# Patient Record
Sex: MALE | Race: WHITE
[De-identification: names, ages, dates, MRNs, and addresses within clinical notes are randomized per-mention and may not be internally consistent; named-entity substitution may affect disease eponyms.]

---

## 2017-02-27 NOTE — PCM.OPNOTE
- General Post-Op/Procedure Note


Date of Surgery/Procedure: 02/27/17


Operative Procedure(s): wide local excision with 1 cm margins


Pre Op Diagnosis: melenoma in situ left neck


Post-Op Diagnosis: Same


Anesthesia Technique: MAC


Primary Surgeon: Hans Lange


EBL in mLs: 0


Complications: None


Condition: Good

## 2017-02-27 NOTE — PCM.PREANE
Preanesthetic Assessment





- ANESTHESIA/TRANSFUSION/FAMILY HX


Anesthesia/Transfusion History: No Prior Transfusion(s), Prior Anesthesia (EGD'

s and Colonoscopies, pt. denies having a general anesthetic.)


Type of Anesthesia Reaction: Denies: Allergy, Anesthesia Awareness, Excessive 

Somnolence, Excessive Nausea/Vomiting, Excessive Itching, Excessive Shivering, 

Malignant Hyperthermia, Malignant Hyperthermia, Family History, 

Pseudocholinesterase Deficiency, Pseudocholinesterase Deficiency, Family 

History of, Urinary Retention, Unknown, Other (see below)


Family History of Anesthesia Reaction: No


Intubation History: Unknown





- REVIEW OF SYSTEMS


Constitutional: Reports: no symptoms


CNS: Reports: no symptoms (Bone pain from metastatic prostate cancer noted.), 

stroke/TIA (in 2014.), weakness


Respiratory: Reports: no symptoms (quit smoking in 1966)


Cardiovascular: Reports: blood pressure problem


GI: Reports: no symptoms (GERD with hiatal hernia noted.), difficulty swallowing


Other: Reports: easy bleeding (Patient on Xarelto for history of pulmonary 

emboli/DVT.), easy bruising





- PHYSICAL ASSESSMENT


HR: 64


O2 Sat by Pulse Oximetry: 98


RR: 16


BP: 174/82


Temp: 36.4 C


Vital Signs: 





 Last Vital Signs











Temp  36.4 C   02/27/17 08:50


 


Pulse  64   02/27/17 08:50


 


Resp  16   02/27/17 08:50


 


BP  174/82 H  02/27/17 08:50


 


Pulse Ox  98   02/27/17 08:50











Height: 1.8 m


Weight: 85.275 kg


NPO Status Date: 02/26/17


NPO Status Time: 22:00


ASA Class: 3


Mental Status: alert & oriented x3


Airway Class: Mallampati = 2


Dentition: Reports: normal dentition, caries


Thyro-Mental Finger Breadths: 3


Mouth Opening Finger Breadths: 3


ROM/Head Extension: full


Respiratory Status: lungs clear to auscultation bilaterally


Cardiovascular Status: regular rate & rhythm, normal S1, S2, no murmur, blood 

pressure WNL





- LAB


Values: 





Reviewed and noted.





- IMAGING/EKG


Impressions: 





EKG: SR rate=70, Probable left atrial enlargement





- ALLERGIES


Allergies/Adverse Reactions: 


 Allergies











Allergy/AdvReac Type Severity Reaction Status Date / Time


 


metoprolol Allergy  Anxiety Verified 02/24/17 10:04


 


rosuvastatin [From Crestor] Allergy  Anxiety Verified 02/24/17 10:04


 


Statins-Hmg-Coa Reductase Allergy  Leg Cramps Verified 02/24/17 10:04





Inhibitor     














- ANESTHESIA PLAN


Preop Beta Blocker: No


Anesthesia Type Planned: MAC





- ACKNOWLEDGEMENTS


Pt an appropriate candidate for the planned anesthesia: Yes


Alternatives and risks of anesthesia discussed w pt/guardian: Yes


Pt/Guardian understands and agree with anesthesia plan: Yes





PreAnesthesia Questionnaire





- Past Health History


Medical/Surgical History: Denies Medical/Surgical History


HEENT History: Reports: None


Cardiovascular History: Reports: High cholesterol, Hypertension


Respiratory History: Reports: PE


Gastrointestinal History: Reports: Hiatal hernia


Other Gastrointestinal History: intestinal polyp, intestinal  metaplasia of 

gastric mucosa, schatzkis ring


Genitourinary History: Reports: Prostate disorder


OB/GYN History: Reports: None


Musculoskeletal History: Reports: Osteoarthritis, Other (see below)


Other Musculoskeletal History: L shoulder osteoarthritis, restless leg syndrome


Neurological History: Reports: TIA


Psychiatric History: Reports: None


Endocrine/Metabolic History: Reports: None


Hematologic History: Reports: None


Immunologic History: Reports: None


Oncologic (Cancer) History: Reports: Prostate


Dermatologic History: Reports: Melanoma





- Past Surgical History


GI Surgical History: Reports: Colonoscopy, EGD





- SUBSTANCE USE


Smoking Status *Q: Former Smoker


Tobacco Use Within Last Twelve Months: No


Second Hand Smoke Exposure: Yes


Recreational Drug Use History: No





- HOME MEDS


Home Medications: 


 Home Meds





Pramipexole Di-HCl [Mirapex] 0.75 mg PO QPM 03/04/16 [History]


Ranitidine [Zantac] 150 mg PO DAILY 03/04/16 [History]


Rivaroxaban [Xarelto] 15 mg PO BID 21 Days 03/06/16 [Rx]


traMADol HCl [Tramadol HCl] 50 mg PO Q6H PRN #15 tablet 03/06/16 [Rx]


Losartan [Cozaar] 1 tab PO DAILY 02/17/17 [History]


Sennosides/Docusate Sodium [Senna-Docusate Sodium] 1 tab PO DAILY 02/17/17 [

History]











- CURRENT (IN HOUSE) MEDS


Current Meds: 





 Current Medications





Lactated Ringer's (Ringers, Lactated)  1,000 mls @ 125 mls/hr IV ASDIRECTED JONY


   Stop: 02/27/17 23:00


   Last Admin: 02/27/17 09:00 Dose:  125 mls/hr


Lidocaine/Sodium Bicarbonate (Buffered Lidocaine 1% In Ns 8.4%)  0.25 ml IV 

ONETIME PRN


   PRN Reason: Prior to IV Start


   Stop: 02/27/17 18:00


   Last Admin: 02/27/17 08:59 Dose:  0.25 ml


Sodium Chloride (Saline Flush)  10 ml FLUSH ASDIRECTED PRN


   PRN Reason: Keep Vein Open


   Stop: 02/27/17 18:00





Discontinued Medications





Lidocaine/Epinephrine (Xylocaine 1% With Epinephrine 1:100,000) Confirm 

Administered Dose 20 ml .ROUTE .[facility]


   Stop: 02/27/17 09:00

## 2017-02-27 NOTE — [id number]
Post Anesthesia Note





- EVALUATION WITHIN 48HRS OF ANESTHETIC


Vital Signs in Normal Range: Yes


Patient Participated in Evaluation: Yes


Respiratory Function Stable: Yes


Airway Patent: Yes


Cardiovascular Function Stable: Yes


Hydration Status Stable: Yes


Pain Control Satisfactory: Yes


Nausea and Vomiting Control Satisfactory: Yes


Mental Status Recovered: Yes

## 2017-02-28 NOTE — OR
DATE OF OPERATION:  02/27/2017

 

SURGEON:  Hans Lange MD

 

ADDENDUM:

 

 

ESTIMATED BLOOD LOSS:

Zero mL.

 

DD:  02/27/2017 10:20:04

DT:  02/27/2017 21:23:06  MMODAL

Job #:  [id number]

## 2017-02-28 NOTE — OR
DATE OF OPERATION:  02/27/2017

 

SURGEON:  Hans Lange MD

 

PREOPERATIVE DIAGNOSIS:

Melanoma in situ, left neck.

 

POSTOPERATIVE DIAGNOSIS:

Melanoma in situ, left neck.

 

POPERAITON PERFORMED:

Excision with 1 cm margin done under IV sedation local anesthetic.

 

DESCRIPTION OF PROCEDURE:

The patient was taken to the operating room, placed in a supine position,

connected to monitoring equipment, given IV sedation.  The patient placed with a

roll under the left shoulder to expose the posterior portion of the left neck

area in question have not been marked was identified and prepped with Betadine,

draped off in a sterile fashion.  Using a marking pen and a ruler a 1 cm margin

around the lesion was then traced and the area was anesthetized with 1%

Xylocaine and then excised.  Excision was carried through the skin into the

subcuticular tissue.  The subcuticular tissue was then closed with interrupted

of 3-0 Vicryl suture and the skin with interrupted 4-0 Prolene suture.  Sterile

dressing placed.  The patient tolerated the procedure and sent to recovery room

in a stable condition.

 

 

 

ANESTHESIA:

 

 

ESTIMATED BLOOD LOSS:

 

 

DD:  02/27/2017 10:15:28

DT:  02/27/2017 21:20:39  MMODAL

Job #:  [id number]

## 2017-07-19 NOTE — PCM.HP
H&P History of Present Illness





- General


Date of Service: 07/19/17


Admit Problem/Dx: 


Small Bowel Obstruction





Source of Information: Patient, Old Records, Provider, RN Notes Reviewed


History Limitations: Reports: No Limitations





- History of Present Illness


Initial Comments - Free Text/Narative: 


This is a 76-year-old pleasant elderly white male with past medical history of 

hypertension, hyperlipidemia, history of hiatal hernia, intestinal polyp, 

intestinal metaplasia of gastric mucosa, Schatzki's ring, melanoma, 

osteoarthritis, restless leg syndrome, TIA, and history of PE/DVT who presents 

to the emergency department with complaints of abdominal pain that is constant 

in nature with no known precipitating factor. His pain is sharp and is 

localized in the epigastric area with radiation to suprapubic region. He rates 

his pain as severe 10 out of 10. He denies anything that would make his symptom 

better. His pain started this morning while on his way to work. By noon, his 

symptom has progressively gotten worse and became dizzy and experienced near 

syncope. 





His chief of complaint is associated with nausea but without vomiting. He 

denies any fever or chills. No shortness of breath, chest pain or dysuria. He 

denies any history of abdominal surgery. However he carries a history of colon 

and prostate cancer with metastatic bone disease.





His initial workup in the emergency department reveals a CBC remarkable for WBC 

of 9.49 and neutrophils of 70.1%. PT of 11.8, INR 1.08, and aPTT of 30. His 

chemistry is remarkable for K of 2.4, anion gap of 19.4, BUN of 21, glucose of 

168, Calcium of 10.2, alkaline phosphatase of 131, Lipase of 74 and troponin is 

less than 0.017. His CT scan of his abdomen and pelvis reveal distal small 

bowel obstruction.





Patient is being admitted for medical management of small bowel obstruction. He 

is full code.  


  ** Abdominal


Pain Score (Numeric/FACES): 8





- Related Data


Allergies/Adverse Reactions: 


 Allergies











Allergy/AdvReac Type Severity Reaction Status Date / Time


 


metoprolol AdvReac  Anxiety Verified 07/20/17 07:03


 


rosuvastatin [From Crestor] AdvReac  Anxiety Verified 07/20/17 07:03


 


Statins-Hmg-Coa Reductase AdvReac  Leg Cramps Verified 07/20/17 07:03





Inhibitor     











Home Medications: 


 Home Meds





Ranitidine [Zantac] 150 mg PO DAILY 03/04/16 [History]


Diltiazem [Cardizem CD] 180 mg PO DAILY 07/19/17 [History]


Furosemide [Lasix] 40 mg PO DAILY 07/19/17 [History]


Potassium Chloride [Klor-Con] 20 meq PO DAILY 07/19/17 [History]


Rivaroxaban [Xarelto] 15 mg PO DAILY 07/19/17 [History]


Losartan/Hydrochlorothiazide [Losartan-HCTZ 100-25 MG] 25 - 100 mg PO DAILY 07/ 20/17 [History]


Pramipexole Di-HCl [Mirapex] 0.75 mg PO BEDTIME 07/20/17 [History]


traMADol [Ultram] 50 mg PO DAILY PRN 07/20/17 [History]











Past Medical History





- Past Health History


Medical/Surgical History: Denies Medical/Surgical History


HEENT History: Reports: None


Cardiovascular History: Reports: High Cholesterol, Hypertension


Respiratory History: Reports: PE


Gastrointestinal History: Reports: Hiatal Hernia


Other Gastrointestinal History: intestinal polyp, intestinal  metaplasia of 

gastric mucosa, schatzkis ring


Genitourinary History: Reports: Prostate Disorder


OB/GYN History: Reports: None


Musculoskeletal History: Reports: Osteoarthritis


Other Musculoskeletal History: L shoulder osteoarthritis, restless leg syndrome


Neurological History: Reports: TIA


Psychiatric History: Reports: None


Endocrine/Metabolic History: Reports: None


Hematologic History: Reports: None


Immunologic History: Reports: None


Oncologic (Cancer) History: Reports: Prostate


Dermatologic History: Reports: Melanoma





- Past Surgical History


Oncologic Surgical History: Reports: Other (See Below)





Social & Family History





- Family History


Cardiac: Reports: Heart Failure, Pacemaker


GI: Reports: None


Psychiatric: Reports: None





- Tobacco Use


Smoking Status *Q: Former Smoker


Years of Tobacco use: 5


Packs/Tins Daily: 1


Used Tobacco, but Quit: No


Month Tobacco Last Used: 1973


Second Hand Smoke Exposure: Yes





- Recreational Drug Use


Recreational Drug Use: No


Drug Use in Last 12 Months: No





H&P Review of Systems





- Review of Systems:


Review Of Systems: See Below


General: Denies: Fever, Chills, Malaise, Weakness, Fatigue


HEENT: Reports: No Symptoms


Pulmonary: Denies: Shortness of Breath


Cardiovascular: Reports: Other (Near Syncope).  Denies: Chest Pain


Gastrointestinal: Reports: Abdominal Pain.  Denies: Nausea, Vomiting


Genitourinary: Reports: No Symptoms


Musculoskeletal: Reports: No Symptoms


Skin: Denies: Cyanosis, Jaundice, Erythema


Psychiatric: Denies: Depression, Anxiety, Hallucinations


Neurological: Denies: Confusion, Dizziness, Difficulty Walking, Weakness, Gait 

Disturbance


Hematologic/Lymphatic: Reports: No Symptoms


Immunologic: Reports: No Symptoms





Exam





- Exam


Exam: See Below





- Vital Signs


Vital Signs: 


 Last Vital Signs











Temp  36.9 C   07/19/17 18:50


 


Pulse  86   07/19/17 18:50


 


Resp  25 H  07/19/17 18:50


 


BP  145/86 H  07/19/17 18:50


 


Pulse Ox  100   07/19/17 18:50











Weight: 83.915 kg





- Exam


General: Alert, Oriented, Cooperative.  No: Mild Distress


HEENT: Conjunctiva Clear, EACs Clear, EOMI, Hearing Intact, Mucosa Moist & Pink

, Nares Patent, Normal Nasal Septum, Posterior Pharynx Clear, Pupils Equal, 

Pupils Reactive


Neck: Supple, Trachea Midline, +2 Carotid Pulse wo Bruit


Lungs: Clear to Auscultation, Normal Respiratory Effort


Cardiovascular: Regular Rate, Regular Rhythm


GI/Abdominal Exam: Normal Bowel Sounds, Soft, No Organomegaly, No Distention, 

No Abnormal Bruit, Tender (epigastric with radiation down to his pubic region).

  No: Rebound


 (Male) Exam: Deferred


Rectal (Males) Exam: Deferred


Back Exam: Normal Inspection, Decreased Range of Motion


Extremities: Normal Inspection, Normal Range of Motion, Non-Tender, No Pedal 

Edema, Normal Capillary Refill


Skin: Warm, Dry, Intact


Neuro Extensive - Mental Status: Oriented x3, Normal Cognition, Memory Intact


Neuro Extensive - Motor, Sensory, Reflexes: CN II-XII Intact, Normal Gait


Psychiatric: Alert, Normal Affect, Normal Mood





- Patient Data


Lab Results Last 24 hrs: 


 Laboratory Results - last 24 hr











  07/19/17 07/19/17 07/19/17 Range/Units





  19:00 19:00 19:00 


 


WBC  9.49 H    (4.23-9.07)  K/mm3


 


RBC  5.04    (4.63-6.08)  M/mm3


 


Hgb  15.1    (13.7-17.5)  gm/L


 


Hct  42.8    (40.1-51.0)  %


 


MCV  84.9    (79.0-92.2)  fl


 


MCH  30.0    (25.7-32.2)  pg


 


MCHC  35.3    (32.2-35.5)  g/dl


 


RDW Std Deviation  42.4    (35.1-43.9)  fL


 


Plt Count  252    (163-337)  K/mm3


 


MPV  9.8    (9.4-12.3)  fl


 


Neut % (Auto)  70.1 H    (34.0-67.9)  %


 


Lymph % (Auto)  17.5 L    (21.8-53.1)  %


 


Mono % (Auto)  10.2    (5.3-12.2)  %


 


Eos % (Auto)  1.9    (0.8-7.0)  


 


Baso % (Auto)  0.1    (0.1-1.2)  %


 


Neut # (Auto)  6.65 H    (1.78-5.38)  K/mm3


 


Lymph # (Auto)  1.66    (1.32-3.57)  K/mm3


 


Mono # (Auto)  0.97 H    (0.30-0.82)  K/mm3


 


Eos # (Auto)  0.18    (0.04-0.54)  K/mm3


 


Baso # (Auto)  0.01    (0.01-0.08)  K/mm3


 


PT    11.8  (8.0-13.0)  SECONDS


 


INR    1.08  


 


APTT    30  (22-36)  SECONDS


 


Sodium   140   (136-145)  mEq/L


 


Potassium   3.4 L   (3.5-5.1)  mEq/L


 


Chloride   99   ()  mEq/L


 


Carbon Dioxide   25   (21-32)  mEq/L


 


Anion Gap   19.4 H   (5-15)  


 


BUN   21 H   (7-18)  mg/dL


 


Creatinine   1.3   (0.7-1.3)  mg/dL


 


Est Cr Clr Drug Dosing   45.31   mL/min


 


Estimated GFR (MDRD)   53   (>60)  mL/min


 


BUN/Creatinine Ratio   16.2   (14-18)  


 


Glucose   168 H   ()  mg/dL


 


Calcium   10.2 H   (8.5-10.1)  mg/dL


 


Total Bilirubin   0.9   (0.2-1.0)  mg/dL


 


AST   29   (15-37)  U/L


 


ALT   40   (16-63)  U/L


 


Alkaline Phosphatase   131 H   ()  U/L


 


Troponin I   < 0.017   (0.00-0.056)  ng/mL


 


C-Reactive Protein   0.2   (<1.0)  mg/dL


 


Total Protein   7.9   (6.4-8.2)  g/dl


 


Albumin   4.4   (3.4-5.0)  g/dl


 


Globulin   3.5   gm/dL


 


Albumin/Globulin Ratio   1.3   (1-2)  


 


Lipase   74   ()  U/L











Result Diagrams: 


 07/19/17 19:00





 07/19/17 19:00





EKG INTERPRETATION


EKG Date: 07/19/17


Time: 07:02


Rhythm: Other (Sinus Rhythm)





*Q Meaningful Use (ADM)





- VTE *Q


VTE Criteria *Q: 








- Stroke *Q


Stroke Criteria *Q: 








- AMI *Q


AMI Criteria *Q: 





Problem List Initiated/Reviewed/Updated: Yes


Orders Last 24hrs: 


 Active Orders 24 hr











 Category Date Time Status


 


 EKG Documentation Completion [RC] STAT Care  07/19/17 18:58 Active


 


 Peripheral IV Care [RC] .AS DIRECTED Care  07/19/17 19:01 Active


 


 Chest Abdomen Pelvis w Cont [CT] Stat Exams  07/19/17 19:00 Taken


 


 UA W/MICROSCOPIC [URIN] Stat Lab  07/19/17 18:58 Uncollected


 


 Sodium Chloride 0.9% [Normal Saline] 1,000 ml Med  07/19/17 19:15 Active





 IV ASDIRECTED   


 


 Sodium Chloride 0.9% [Normal Saline] 100 ml Med  07/19/17 19:15 Active





 IV ASDIRECTED   


 


 Sodium Chloride 0.9% [Saline Flush] Med  07/19/17 19:00 Active





 10 ml FLUSH ASDIRECTED PRN   


 


 Sodium Chloride 0.9% [Saline Flush] Med  07/19/17 19:07 Active





 10 ml FLUSH ONETIME PRN   


 


 NG [Nasogastric Orogastric Tube Insertion] [OM.PC] Oth  07/19/17 19:31 Ordered





 Routine   


 


 Peripheral IV Insertion Adult [OM.PC] Stat Oth  07/19/17 18:58 Ordered








 Medication Orders





Sodium Chloride (Normal Saline)  1,000 mls @ 75 mls/hr IV ASDIRECTED JONY


   Last Admin: 07/19/17 19:40  Dose: 75 mls/hr


Sodium Chloride (Normal Saline)  100 mls @ 65 mls/hr IV ASDIRECTED JONY


   Last Admin: 07/19/17 19:38  Dose: 65 mls/hr


Sodium Chloride (Saline Flush)  10 ml FLUSH ASDIRECTED PRN


   PRN Reason: Keep Vein Open


   Last Admin: 07/19/17 19:57  Dose: 10 ml


Sodium Chloride (Saline Flush)  10 ml FLUSH ONETIME PRN


   PRN Reason: IV FLUSH


   Last Admin: 07/19/17 19:39  Dose: 10 ml








Assessment/Plan Comment:: 


Assessment/Plan:


Acute:


SBO


   - CT Scan: Distal Small Bowel Obstruction


   - No Hx/o GI Surgeries


   - Last BM yesterday, He is not passing gas


   - Supportive Care


   - NGT placed in ED


   - Prokinetic Agent (cautioned family, might worsen his restless leg)


   - Ambulated TID as tolerated


   - Repeat Abn/Pelvis CTA





Mild Hypokalemia


   - K 3.4 likley from diuretic +/- GI Loss


   - Will replete for now


   - Pharmacy to replete and monitor starting tomorrow





Multiple Osteoclerotic Metastasis


   - Hx/o Colon/Prostate Cancer


   - CA 10.2 and Alk Phos of 131 





Chronic:


HTN


HLD


OA


Hiatial Hernia


RLS


TIA


Hx/o Intestinal Metaplasia of gastric mucosa


Schatzkis Ring


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


Hx/o Colon and Prostate CA


Hx/o Melanoma





Plan:


Admit to Med-Surg


NPO except ice chips/sips of water/home meds w/ clamped NGT per protocol


Routine AM Labs


Resume Home Medications


Consider GS consult for worsening of symptoms


Fall Precautions


PT/OT consult


SW/CM for d/c planning


Code status: 1


Additional orders as above

## 2017-07-19 NOTE — EDM.PDOC
ED HPI GENERAL MEDICAL PROBLEM





- General


Chief Complaint: Abdominal Pain


Stated Complaint: ABDOMINAL PAIN


Time Seen by Provider: 07/19/17 18:45


Source of Information: Reports: Patient


History Limitations: Reports: No Limitations





- History of Present Illness


INITIAL COMMENTS - FREE TEXT/NARRATIVE: 


Patient is a 76-year-old male who presents ED complaining of a tendon sensation 

to his abdomen. This starts at the epigastric region and radiates into his 

suprapubic region. Pain is described as being severe rated 10 out of 10, 

constant, with no waxing and waning, and no known precipitating factors or 

alleviating treatments. States this morning started developing this discomfort 

while going to work. Pain is mild at that time and progressively got worse to 

the course the day. States at approximately 2:00 he had to lay down at the work 

trailer due to the pain. On the way home he became dizzy and almost passed out. 

Again he describes as a sharp tearing sensation with no radiation. He's had one 

episode of dry heaving. He has no shortness of breath, pain with urination, 

chest pain, dysuria, fever/chills, blood in stools, and diarrhea. Last bowel 

movement was yesterday described as normal with no blood present. Denies any 

history of abdominal surgeries. No history of small bowel obstructions. He has 

a history of blood clots in his legs and also lungs. He is on is rolled to area 

in addition is a history of prostate cancer that has metastasized to his bones. 

He has received chemotherapy and radiation to his right hip. For additional 

medications see list.


  ** Abdominal


Pain Score (Numeric/FACES): 8





- Related Data


 Allergies











Allergy/AdvReac Type Severity Reaction Status Date / Time


 


metoprolol Allergy  Anxiety Verified 02/24/17 10:04


 


rosuvastatin [From Crestor] Allergy  Anxiety Verified 02/24/17 10:04


 


Statins-Hmg-Coa Reductase Allergy  Leg Cramps Verified 02/24/17 10:04





Inhibitor     











Home Meds: 


 Home Meds





Pramipexole Di-HCl [Mirapex] 0.75 mg PO QPM 03/04/16 [History]


Ranitidine [Zantac] 150 mg PO DAILY 03/04/16 [History]


Rivaroxaban [Xarelto] 15 mg PO BID 21 Days 03/06/16 [Rx]


traMADol HCl [Tramadol HCl] 50 mg PO Q6H PRN #15 tablet 03/06/16 [Rx]


Losartan [Cozaar] 1 tab PO DAILY 02/17/17 [History]


Sennosides/Docusate Sodium [Senna-Docusate Sodium] 1 tab PO DAILY 02/17/17 [

History]











Past Medical History





- Past Health History


Medical/Surgical History: Denies Medical/Surgical History


HEENT History: Reports: None


Cardiovascular History: Reports: High Cholesterol, Hypertension


Respiratory History: Reports: PE


Gastrointestinal History: Reports: Hiatal Hernia


Other Gastrointestinal History: intestinal polyp, intestinal  metaplasia of 

gastric mucosa, schatzkis ring


Genitourinary History: Reports: Prostate Disorder


OB/GYN History: Reports: None


Musculoskeletal History: Reports: Osteoarthritis


Other Musculoskeletal History: L shoulder osteoarthritis, restless leg syndrome


Neurological History: Reports: TIA


Psychiatric History: Reports: None


Endocrine/Metabolic History: Reports: None


Hematologic History: Reports: None


Immunologic History: Reports: None


Oncologic (Cancer) History: Reports: Prostate


Dermatologic History: Reports: Melanoma





- Past Surgical History


Oncologic Surgical History: Reports: Other (See Below)





Social & Family History





- Family History


Cardiac: Reports: Heart Failure, Pacemaker


GI: Reports: None


Psychiatric: Reports: None





- Tobacco Use


Smoking Status *Q: Former Smoker


Years of Tobacco use: 5


Packs/Tins Daily: 1


Used Tobacco, but Quit: No


Month Tobacco Last Used: 1973


Second Hand Smoke Exposure: Yes





- Recreational Drug Use


Recreational Drug Use: No


Drug Use in Last 12 Months: No





ED ROS GENERAL





- Review of Systems


Review Of Systems: ROS reveals no pertinent complaints other than HPI.





ED EXAM, GI/ABD





- Physical Exam


Exam: See Below


Exam Limited By: No Limitations


General Appearance: Alert, WD/WN, Moderate Distress


Eyes: Bilateral: Normal Appearance


Ears: Hearing Grossly Normal


Nose: Normal Inspection


Throat/Mouth: Normal Voice, No Airway Compromise


Head: Atraumatic, Normocephalic


Neck: Normal Inspection, Supple


Respiratory/Chest: No Respiratory Distress, Lungs Clear, Normal Breath Sounds, 

Chest Non-Tender


Cardiovascular: Normal Peripheral Pulses, Regular Rate, Rhythm, No Edema, No JVD

, No Murmur.  No: JVD


GI/Abdominal Exam: Soft, No Organomegaly, Distended (mild distention), Tender (

epigastric region radiating down to the suprapubic region. Minimal pain upon 

palpation.  No pulsating mass. Pain is out of proportion to physical exam. He 

also has a history of blood clots to his legs and lungs and is on xarelto. ), 

Other (hypoactive)


Back Exam: Normal Inspection


Extremities: Normal Inspection, Normal Range of Motion, Non-Tender, No Pedal 

Edema, Normal Capillary Refill, Other (No extension of pain into his lower 

extremities. No sensory deficits noted. Pulses are intact distally.)


Neurological: Alert, Oriented, Normal Cognition, No Motor/Sensory Deficits


Psychiatric: Normal Affect


Skin Exam: Warm, Dry, Intact, Normal Color, No Rash





Course





- Vital Signs


Last Recorded V/S: 


 Last Vital Signs











Temp  98.5 F   07/19/17 18:50


 


Pulse  86   07/19/17 18:50


 


Resp  25 H  07/19/17 18:50


 


BP  145/86 H  07/19/17 18:50


 


Pulse Ox  100   07/19/17 18:50














- Orders/Labs/Meds


Orders: 


 Active Orders 24 hr











 Category Date Time Status


 


 EKG Documentation Completion [RC] STAT Care  07/19/17 18:58 Active


 


 Peripheral IV Care [RC] .AS DIRECTED Care  07/19/17 19:01 Active


 


 Chest Abdomen Pelvis w Cont [CT] Stat Exams  07/19/17 19:00 Taken


 


 UA W/MICROSCOPIC [URIN] Stat Lab  07/19/17 18:58 Uncollected


 


 Sodium Chloride 0.9% [Normal Saline] 1,000 ml Med  07/19/17 19:15 Active





 IV ASDIRECTED   


 


 Sodium Chloride 0.9% [Normal Saline] 100 ml Med  07/19/17 19:15 Active





 IV ASDIRECTED   


 


 Sodium Chloride 0.9% [Saline Flush] Med  07/19/17 19:00 Active





 10 ml FLUSH ASDIRECTED PRN   


 


 Sodium Chloride 0.9% [Saline Flush] Med  07/19/17 19:07 Active





 10 ml FLUSH ONETIME PRN   


 


 NG [Nasogastric Orogastric Tube Insertion] [OM.PC] Oth  07/19/17 19:31 Ordered





 Routine   


 


 Peripheral IV Insertion Adult [OM.PC] Stat Oth  07/19/17 18:58 Ordered








 Medication Orders





Sodium Chloride (Normal Saline)  1,000 mls @ 75 mls/hr IV ASDIRECTED JONY


   Last Admin: 07/19/17 19:40  Dose: 75 mls/hr


Sodium Chloride (Normal Saline)  100 mls @ 65 mls/hr IV ASDIRECTED JONY


   Last Admin: 07/19/17 19:38  Dose: 65 mls/hr


Sodium Chloride (Saline Flush)  10 ml FLUSH ASDIRECTED PRN


   PRN Reason: Keep Vein Open


   Last Admin: 07/19/17 19:57  Dose: 10 ml


Sodium Chloride (Saline Flush)  10 ml FLUSH ONETIME PRN


   PRN Reason: IV FLUSH


   Last Admin: 07/19/17 19:39  Dose: 10 ml








Labs: 


 Laboratory Tests











  07/19/17 07/19/17 07/19/17 Range/Units





  19:00 19:00 19:00 


 


WBC  9.49 H    (4.23-9.07)  K/mm3


 


RBC  5.04    (4.63-6.08)  M/mm3


 


Hgb  15.1    (13.7-17.5)  gm/L


 


Hct  42.8    (40.1-51.0)  %


 


MCV  84.9    (79.0-92.2)  fl


 


MCH  30.0    (25.7-32.2)  pg


 


MCHC  35.3    (32.2-35.5)  g/dl


 


RDW Std Deviation  42.4    (35.1-43.9)  fL


 


Plt Count  252    (163-337)  K/mm3


 


MPV  9.8    (9.4-12.3)  fl


 


Neut % (Auto)  70.1 H    (34.0-67.9)  %


 


Lymph % (Auto)  17.5 L    (21.8-53.1)  %


 


Mono % (Auto)  10.2    (5.3-12.2)  %


 


Eos % (Auto)  1.9    (0.8-7.0)  


 


Baso % (Auto)  0.1    (0.1-1.2)  %


 


Neut # (Auto)  6.65 H    (1.78-5.38)  K/mm3


 


Lymph # (Auto)  1.66    (1.32-3.57)  K/mm3


 


Mono # (Auto)  0.97 H    (0.30-0.82)  K/mm3


 


Eos # (Auto)  0.18    (0.04-0.54)  K/mm3


 


Baso # (Auto)  0.01    (0.01-0.08)  K/mm3


 


PT    11.8  (8.0-13.0)  SECONDS


 


INR    1.08  


 


APTT    30  (22-36)  SECONDS


 


Sodium   140   (136-145)  mEq/L


 


Potassium   3.4 L   (3.5-5.1)  mEq/L


 


Chloride   99   ()  mEq/L


 


Carbon Dioxide   25   (21-32)  mEq/L


 


Anion Gap   19.4 H   (5-15)  


 


BUN   21 H   (7-18)  mg/dL


 


Creatinine   1.3   (0.7-1.3)  mg/dL


 


Est Cr Clr Drug Dosing   45.31   mL/min


 


Estimated GFR (MDRD)   53   (>60)  mL/min


 


BUN/Creatinine Ratio   16.2   (14-18)  


 


Glucose   168 H   ()  mg/dL


 


Calcium   10.2 H   (8.5-10.1)  mg/dL


 


Total Bilirubin   0.9   (0.2-1.0)  mg/dL


 


AST   29   (15-37)  U/L


 


ALT   40   (16-63)  U/L


 


Alkaline Phosphatase   131 H   ()  U/L


 


Troponin I   < 0.017   (0.00-0.056)  ng/mL


 


C-Reactive Protein   0.2   (<1.0)  mg/dL


 


Total Protein   7.9   (6.4-8.2)  g/dl


 


Albumin   4.4   (3.4-5.0)  g/dl


 


Globulin   3.5   gm/dL


 


Albumin/Globulin Ratio   1.3   (1-2)  


 


Lipase   74   ()  U/L











Meds: 


Medications











Generic Name Dose Route Start Last Admin





  Trade Name Freq  PRN Reason Stop Dose Admin


 


Sodium Chloride  1,000 mls @ 75 mls/hr  07/19/17 19:15  07/19/17 19:40





  Normal Saline  IV   75 mls/hr





  ASDIRECTED JONY   Administration


 


Sodium Chloride  100 mls @ 65 mls/hr  07/19/17 19:15  07/19/17 19:38





  Normal Saline  IV   65 mls/hr





  ASDIRECTED JONY   Administration


 


Sodium Chloride  10 ml  07/19/17 19:00  07/19/17 19:57





  Saline Flush  FLUSH   10 ml





  ASDIRECTED PRN   Administration





  Keep Vein Open   


 


Sodium Chloride  10 ml  07/19/17 19:07  07/19/17 19:39





  Saline Flush  FLUSH   10 ml





  ONETIME PRN   Administration





  IV FLUSH   














Discontinued Medications














Generic Name Dose Route Start Last Admin





  Trade Name Freq  PRN Reason Stop Dose Admin


 


Iopamidol  100 ml  07/19/17 19:07  07/19/17 19:38





  Isovue-370 (76%)  IVPUSH  07/19/17 19:08  100 ml





  ONETIME ONE   Administration


 


Iopamidol  50 ml  07/19/17 19:07  07/19/17 19:38





  Isovue-370 (76%)  IVPUSH  07/19/17 19:08  50 ml





  ONETIME ONE   Administration


 


Lorazepam  0.5 mg  07/19/17 19:53  07/19/17 19:57





  Ativan  IVPUSH  07/19/17 19:54  0.5 mg





  ONETIME ONE   Administration


 


Lorazepam  1 mg  07/19/17 21:05  07/19/17 21:09





  Ativan  IVPUSH  07/19/17 21:06  1 mg





  ONETIME ONE   Administration














- Re-Assessments/Exams


Free Text/Narrative Re-Assessment/Exam: 





IV will be established with normal saline 75 mils per hour. Dilaudid 0.5 mg IVP 

will be administered for severe abdominal pain. Initial labs include CBC, chem 

14, CRP, lipase, Coags, troponin, and UA. 1 view of the abdomen to be obtained 

unless CT is present. Once IV access is established she will undergo a chest 

abdomen pelvis CTA to evaluate for aorta abnormalities.  Previous creatinine 

was 1.0 dated March 2016. Patient states he has no kidney problems. Will go 

ahead and obtain the CT without lab results.





07/19/17 19:31  CT of the abdomen initial interpretation reviewed with Dr. Pringle indicates distal small bowel obstruction. Ordered NG tube to be placed 

with intermittent suction. Final interpretation  of CT is still pending.





07/19/17 19:53 NG tube placed. Patient started vomiting up greenish fluid. 

Believe this is due to the patient gagging on the NG tube. Ordered Ativan 0.5 

mg IV.





07/19/17 19:56 Labs reviewed: White blood cell count 9.49, hemoglobin 15.1, 

neutrophil percent is 70.1, neutrophil number is 6.65, coags are normal, sodium 

140, potassium 3.4, AG 19.4, creatinine 1.3, glucose was 68, alk phosphatase 131

, troponin less than 0.017, lipase 74.





07/19/17 20:35 


CT of the chest impression: No active disease in the chest. No acute aortic 

event. There are multiple osteosclerotic metastases.  


CT abdomen and pelvis impression: No acute aortic event. Distal small bowel 

obstruction possibly secondary to adhesions. Innumerable osteosclerotic bone 

metastases. 





07/19/17 21:05 Patient requesting something for his restless leg syndrome. 

Ordered 1 mg Ativan.





07/19/17 21:28 Discussed patient with Dr. Hicks. He has accepted the patient. 

Per [facility] patient meets inpatient status.  Patient will be admitted to [facility] 

with telemetry. Reassessment of the patient does not reveal a surgical abdomen. 

He was much more relaxed after receiving the Ativan. On palpation minimal pain 

present.  Pain was relieved with NG tube placement. Per patient he has not been 

passing gas. Last meal was this a.m. 











Departure





- Departure


Time of Disposition: 21:30


Disposition: Admitted As Inpatient 64


Condition: Fair


Clinical Impression: 


 Small bowel obstruction due to adhesions








- Discharge Information





- My Orders


Last 24 Hours: 


My Active Orders





07/19/17 18:58


EKG Documentation Completion [RC] STAT 


UA W/MICROSCOPIC [URIN] Stat 


Peripheral IV Insertion Adult [OM.PC] Stat 





07/19/17 19:00


Chest Abdomen Pelvis w Cont [CT] Stat 


Sodium Chloride 0.9% [Saline Flush]   10 ml FLUSH ASDIRECTED PRN 





07/19/17 19:01


Peripheral IV Care [RC] .AS DIRECTED 





07/19/17 19:07


Sodium Chloride 0.9% [Saline Flush]   10 ml FLUSH ONETIME PRN 





07/19/17 19:15


Sodium Chloride 0.9% [Normal Saline] 1,000 ml IV ASDIRECTED 


Sodium Chloride 0.9% [Normal Saline] 100 ml IV ASDIRECTED 





07/19/17 19:31


NG [Nasogastric Orogastric Tube Insertion] [OM.PC] Routine 














- Assessment/Plan


Last 24 Hours: 


My Active Orders





07/19/17 18:58


EKG Documentation Completion [RC] STAT 


UA W/MICROSCOPIC [URIN] Stat 


Peripheral IV Insertion Adult [OM.PC] Stat 





07/19/17 19:00


Chest Abdomen Pelvis w Cont [CT] Stat 


Sodium Chloride 0.9% [Saline Flush]   10 ml FLUSH ASDIRECTED PRN 





07/19/17 19:01


Peripheral IV Care [RC] .AS DIRECTED 





07/19/17 19:07


Sodium Chloride 0.9% [Saline Flush]   10 ml FLUSH ONETIME PRN 





07/19/17 19:15


Sodium Chloride 0.9% [Normal Saline] 1,000 ml IV ASDIRECTED 


Sodium Chloride 0.9% [Normal Saline] 100 ml IV ASDIRECTED 





07/19/17 19:31


NG [Nasogastric Orogastric Tube Insertion] [OM.PC] Routine

## 2017-07-20 NOTE — PCM.PN
- General Info


Date of Service: 07/20/17


Admission Dx/Problem (Free Text): 


Small Bowel Obstruction





Subjective Update: 


Follow Up





Functional Status: Reports: Pain Controlled, Urinating.  Denies: Tolerating Diet

, Ambulating





- Review of Systems


Systems Review Comment:: 


He had a tough night due his restless leg. However he had 2 bowel movements 

last night. He is exhausted and started to calm down by 6 this morning. No 

other acute issues reported by his morning nurse. ROS is unobtainable at this 

time.








- Patient Data


Vitals - most recent: 


 Last Vital Signs











Temp  37.0 C   07/20/17 08:29


 


Pulse  61   07/20/17 08:29


 


Resp  16   07/20/17 08:29


 


BP  109/56 L  07/20/17 08:29


 


Pulse Ox  93 L  07/20/17 08:29











Weight - most recent: 83.325 kg


I&O - last 24 hours: 


 Intake & Output











 07/19/17 07/20/17 07/20/17





 22:59 06:59 14:59


 


Intake Total  325 


 


Balance  325 











Lab Results last 24 hrs: 


 Laboratory Results - last 24 hr











  07/20/17 07/20/17 Range/Units





  00:25 02:04 


 


POC Glucose   146 H  ()  mg/dL


 


Urine Color  Yellow   (Yellow)  


 


Urine Appearance  Clear   (Clear)  


 


Urine pH  7.0   (5.0-8.0)  


 


Ur Specific Gravity  1.015   (1.005-1.030)  


 


Urine Protein  1+ H   (Negative)  


 


Urine Glucose (UA)  Negative   (Negative)  


 


Urine Ketones  Negative   (Negative)  


 


Urine Occult Blood  Trace-intact H   (Negative)  


 


Urine Nitrite  Negative   (Negative)  


 


Urine Bilirubin  Negative   (Negative)  


 


Urine Urobilinogen  1.0   (0.2-1.0)  


 


Ur Leukocyte Esterase  Negative   (Negative)  


 


Urine RBC  0-5   (0-5)  /hpf


 


Urine WBC  0-5   (0-5)  /hpf


 


Ur Epithelial Cells  Not seen   (0-5)  /hpf


 


Urine Bacteria  Not seen   (FEW)  /hpf


 


Hyaline Casts  0-5   (0-5)  /lpf


 


Urine Mucus  Not seen   (FEW)  /hpf











Med Orders - Current: 


 Current Medications





Acetaminophen (Tylenol)  650 mg PO Q4H PRN


   PRN Reason: Pain (Mild 1-3)/fever


Hydrocodone Bitart/Acetaminophen (Norco 325-5 Mg)  1 tab PO Q4H PRN


   PRN Reason: Pain (moderate 4-6)


   Last Admin: 07/20/17 05:20 Dose:  1 tab


Albuterol/Ipratropium (Duoneb 3.0-0.5 Mg/3 Ml)  3 ml NEB Q4H PRN


   PRN Reason: Shortness Of Breath/wheezing


Bisacodyl (Dulcolax)  5 mg PO DAILY PRN


   PRN Reason: Constipation


Diltiazem HCl (Diltiazem)  10 mg IVPUSH Q4H PRN


   PRN Reason: Tachycardia


Diltiazem HCl (Cardizem Cd)  180 mg PO DAILY Replaced by [facility]


Docusate Sodium (Colace)  100 mg PO BID PRN


   PRN Reason: Constipation


Famotidine (Pepcid)  20 mg IVPUSH BID JONY


Furosemide (Lasix)  40 mg PO DAILY JONY


Hydralazine HCl (Apresoline)  20 mg IVPUSH Q4H PRN


   PRN Reason: Hypertension


Hydromorphone HCl (Dilaudid)  1 mg IVPUSH Q4H PRN


   PRN Reason: Pain (severe 7-10)


   Last Admin: 07/20/17 06:09 Dose:  1 mg


Dextrose/Sodium Chloride (Dextrose 5%-Normal Saline)  1,000 mls @ 100 mls/hr IV 

ASDIRECTED JONY


   Last Admin: 07/20/17 10:24 Dose:  100 mls/hr


Lorazepam (Ativan)  1 mg IVPUSH Q4H PRN


   PRN Reason: Restless legs


   Last Admin: 07/20/17 01:17 Dose:  1 mg


Losartan Potassium (Cozaar)  100 mg PO DAILY Replaced by [facility]


Magnesium Sulfate (Pharmacy To Dose - Magnesium Replacement)  0 dose .XX 

ASDIRECTED PRN


   PRN Reason: RX TO MONITOR MAG LEVELS


Ondansetron HCl (Zofran)  4 mg IV Q6H PRN


   PRN Reason: Nausea/Vomiting


Polyethylene Glycol (Miralax)  17 gm PO DAILY PRN


   PRN Reason: Constipation


Potassium Chloride (Klor-Con M20)  20 meq PO DAILY Replaced by [facility]


Potassium Chloride (Pharmacy To Dose - Potassium Replacement)  0 dose .XX 

ASDIRECTED PRN


   PRN Reason: RX TO MONITOR K LEVELS


Pramipexole Dihydrochloride (Mirapex)  0.75 mg PO BEDTIME Replaced by [facility]


Rivaroxaban (Xarelto)  15 mg PO DAILY Replaced by [facility]


Senna/Docusate Sodium (Senna Plus)  1 tab PO BID PRN


   PRN Reason: Constipation


Sodium Chloride (Saline Flush)  10 ml FLUSH ASDIRECTED PRN


   PRN Reason: Keep Vein Open


   Last Admin: 07/19/17 19:57 Dose:  10 ml


Temazepam (Restoril)  15 mg PO BEDTIME PRN


   PRN Reason: Sleep





Discontinued Medications





Clonazepam (Klonopin)  0.5 mg PO ONETIME ONE


   Stop: 07/20/17 02:45


   Last Admin: 07/20/17 02:55 Dose:  0.5 mg


Clonidine HCl (Catapres)  0.1 mg PO ONETIME ONE


   Stop: 07/20/17 02:44


   Last Admin: 07/20/17 02:55 Dose:  0.1 mg


Famotidine (Pepcid)  20 mg IVPUSH ONETIME ONE


   Stop: 07/19/17 21:54


   Last Admin: 07/20/17 00:13 Dose:  Not Given


Famotidine (Pepcid)  20 mg IVPUSH ONETIME ONE


   Stop: 07/20/17 00:13


   Last Admin: 07/20/17 00:29 Dose:  20 mg


Sodium Chloride (Normal Saline)  1,000 mls @ 75 mls/hr IV ASDIRECTED Replaced by [facility]


   Last Admin: 07/19/17 19:40 Dose:  75 mls/hr


Sodium Chloride (Normal Saline)  100 mls @ 65 mls/hr IV ASDIRECTED Replaced by [facility]


   Last Admin: 07/19/17 19:38 Dose:  65 mls/hr


Promethazine HCl 12.5 mg/ (Sodium Chloride)  50.5 mls @ 100 mls/hr IV Q6H PRN


   PRN Reason: Nausea/Vomiting


Magnesium Sulfate 2 gm/ Premix  50 mls @ 25 mls/hr IV ONETIME ONE


   Stop: 07/20/17 04:53


   Last Admin: 07/20/17 03:00 Dose:  25 mls/hr


Iopamidol (Isovue-370 (76%))  100 ml IVPUSH ONETIME ONE


   Stop: 07/19/17 19:08


   Last Admin: 07/19/17 19:38 Dose:  100 ml


Iopamidol (Isovue-370 (76%))  50 ml IVPUSH ONETIME ONE


   Stop: 07/19/17 19:08


   Last Admin: 07/19/17 19:38 Dose:  50 ml


Lorazepam (Ativan)  0.5 mg IVPUSH ONETIME ONE


   Stop: 07/19/17 19:54


   Last Admin: 07/19/17 19:57 Dose:  0.5 mg


Lorazepam (Ativan)  1 mg IVPUSH ONETIME ONE


   Stop: 07/19/17 21:06


   Last Admin: 07/19/17 21:09 Dose:  1 mg


Lorazepam (Ativan)  0.5 mg IVPUSH ONETIME ONE


   Stop: 07/20/17 03:09


   Last Admin: 07/20/17 03:15 Dose:  0.5 mg


Metoclopramide HCl (Reglan)  10 mg IVPUSH Q6H Replaced by [facility]


   Last Admin: 07/20/17 00:28 Dose:  10 mg


Non-Formulary Medication (Ranitidine)  150 mg PO DAILY Replaced by [facility]


Potassium Chloride (Klor-Con M20)  20 meq PO Q3H Replaced by [facility]


   Stop: 07/20/17 02:01


   Last Admin: 07/20/17 02:46 Dose:  Not Given


Pramipexole Dihydrochloride (Mirapex)  0.75 mg PO ONETIME ONE


   Stop: 07/20/17 00:31


   Last Admin: 07/20/17 00:37 Dose:  0.75 mg


Rivaroxaban (Xarelto)  15 mg PO BID Replaced by [facility]


Ropinirole HCl (Requip)  0.25 mg PO ONETIME ONE


   Stop: 07/20/17 02:21


   Last Admin: 07/20/17 02:48 Dose:  0.25 mg


Sodium Chloride (Saline Flush)  10 ml FLUSH ONETIME PRN


   PRN Reason: IV FLUSH


   Last Admin: 07/19/17 19:39 Dose:  10 ml











- Exam


General: other (sleeping at this moment)


Lungs: Normal Respiratory Effort, Decreased Breath Sounds


Cardiovascular: Regular Rate, Regular Rhythm


GI/Abdominal Exam: Normal Bowel Sounds, Soft, No Abnormal Bruit, Distended.  No

: Guarding, Rigid, Rebound, Tender


 (Male) Exam: Deferred


Back Exam: Normal Inspection, Decreased Range of Motion


Extremities: Normal Inspection, Normal Range of Motion, Non-Tender, No Pedal 

Edema, Normal Capillary Refill


Peripheral Pulses: 2+: Dorsalis Pedis (L), Dorsalis Pedis (R)


Skin: warm, dry, intact


Physical Findings Comments:: 


Limited physical exam, patient is currently asleep.








- Problem List Review


Problem List Initiated/Reviewed/Updated: Yes





- My Orders


Last 24 Hours: 


My Active Orders





07/19/17 23:25


LORazepam [Ativan]   1 mg IVPUSH Q4H PRN 





07/20/17 02:47


Patient Status [ADT] Routine 





07/20/17 09:00


Rivaroxaban [Xarelto]   15 mg PO DAILY 





07/20/17 21:00


Pramipexole [Mirapex]   0.75 mg PO BEDTIME 














- Plan


Plan:: 


Assessment/Plan:


Acute:


SBO


   - Improving, had 2 bowel movements last night


   - CT Scan: Distal Small Bowel Obstruction


   - No Hx/o GI Surgeries


   - Last BM yesterday, He is not passing gas


   - Supportive Care


   - NGT placed in ED


   - Prokinetic Agent (cautioned family, might worsen his restless leg)


   - Ambulated TID as tolerated


   - Repeat Abn/Pelvis CTA





Mild Hypokalemia


   - K 3.4 likley from diuretic +/- GI Loss


   - Will replete for now


   - Pharmacy to replete and monitor starting tomorrow





Multiple Osteoclerotic Metastasis


   - Hx/o Colon/Prostate Cancer


   - CA 10.2 and Alk Phos of 131 





Acute on Chronic RLS 


   - Had reglan last night and chaitanya reversed his mirapex


   - He had gotten ropironole, clonidine and klonopin on top of ativan overnight





Chronic:


HTN


HLD


OA


Hiatial Hernia


RLS


TIA


Hx/o Intestinal Metaplasia of gastric mucosa


Schatzkis Ring


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


Hx/o Colon and Prostate CA


Hx/o Melanoma





Plan:


Patient is fairly stable-guarded


NPO except ice chips/sips of water/home meds w/ clamped NGT per protocol


Repeat labs this afternoon


Consider GS consult for worsening of symptoms


PT/OT consult: hold off for now


SW/CM for d/c planning


Code status: 1


Additional orders as above





Patient will rest for the rest of the day and will re-examine him when he wakes 

up sometime this afternoon. His family member, present of bed at bedside, was 

updated about his clinical progress and issues that occurred overnight.

## 2017-07-20 NOTE — CT
CT chest

 

Technique: Multiple axial sections through the chest were obtained.  

Intravenous contrast was utilized.

 

Comparison: Previous CT chest exam of 03/04/16.

 

Findings: Pulmonary arteries show normal enhancement.  No larger 

pulmonary emboli are seen.  Aorta shows mild ectasia within the 

ascending aorta measuring 3.7 cm.  Descending aorta measures 

approximately 2.7 cm.  No aortic dissection is seen.  Mild coronary 

artery calcification is seen.  Mediastinum and hilar regions show no 

adenopathy or mass.  No pericardial thickening is seen.  No axillary 

adenopathy is appreciated.  Mild emphysematous changes are present.  

No acute infiltrates are seen within the chest.  No pleural effusion 

is seen.

 

Bone window settings were reviewed which show scattered sclerotic 

change within the thoracic spine as well as within the sternum and 

ribs compatible with osteoblastic metastasis.  Severe degenerative 

change noted within the left glenohumeral joint.

 

Impression:

1.  Multiple osteoblastic metastasis.

2.  Other incidental findings.  Nothing acute is identified on CT 

study of the chest.

 

Diagnostic code #9

 

I agree with preliminary report issued by [facility] ([facility] report finalized 

on 07/19/17, 8:57 PM Central Time)

 

 

 

CT abdomen and pelvis

 

Technique: Multiple axial sections were obtained from above the dome 

of the diaphragm inferiorly through the pubic symphysis.  Intravenous 

contrast was utilized.  No oral contrast has been given.

 

Comparison: Previous CT abdomen and pelvis exam of 12/27/12.

 

Findings: Liver shows no focal parenchymal abnormality.  Spleen 

appears within normal limits.  Adrenal glands show no nodule.  

Pancreas is within normal limits.  Gallbladder shows no calcified 

gallstones.  Both kidneys show symmetric contrast enhancement without 

hydronephrosis.  Several minimal cortical cysts are felt to be 

present.  Aorta shows atherosclerotic change without aneurysm.  No 

dissection is seen.  No pelvic mass or adenopathy is identified.  

Radiation implant seeds are seen within the prostate gland.

 

Mildly dilated and fluid-filled small bowel is seen.  Findings are 

suspicious for distal small bowel obstruction.  Etiology is not 

identified and findings most likely due to nonvisualized adhesion.  No

 free fluid or inflammatory changes seen.

 

Innumerable osteoblastic lesions are seen within the spine, hips and 

pelvis.

 

Impression:

1.  Multiple osteoblastic metastasis within the bony structures.

2.  Fluid-filled and dilated small bowel.  These findings are 

suspicious for mild distal small bowel obstruction most likely from 

adhesion.

3.  Previous radiation therapy to the prostate gland.

4.  Other incidental findings.

 

Diagnostic code #9

 

I agree with preliminary report issued by [facility] ([facility] report finalized 

on 07/19/17, 8:57 PM Central Time)

## 2017-07-21 NOTE — PCM.PN
- General Info


Date of Service: 07/21/17


Functional Status: Reports: Pain Controlled, Tolerating Diet (full liquids), 

Ambulating





- Review of Systems


General: Reports: No Symptoms


HEENT: Reports: No Symptoms


Pulmonary: Reports: No Symptoms


Cardiovascular: Reports: No Symptoms


Gastrointestinal: Reports: No Symptoms


Genitourinary: Reports: No Symptoms


Musculoskeletal: Reports: No Symptoms


Skin: Reports: No Symptoms


Neurological: Reports: No Symptoms


Psychiatric: Reports: No Symptoms





- Patient Data


Vitals - most recent: 


 Last Vital Signs











Temp  37.1 C   07/21/17 08:34


 


Pulse  60   07/21/17 08:34


 


Resp  16   07/21/17 08:34


 


BP  143/69 H  07/21/17 08:34


 


Pulse Ox  97   07/21/17 08:34











Weight - most recent: 83.461 kg


I&O - last 24 hours: 


 Intake & Output











 07/20/17 07/21/17 07/21/17





 22:59 06:59 14:59


 


Intake Total 1720 2987 360


 


Balance 1720 2987 360











Lab Results last 24 hrs: 


 Laboratory Results - last 24 hr











  07/20/17 07/20/17 07/21/17 Range/Units





  15:10 15:10 05:45 


 


WBC  5.74   5.26  (4.23-9.07)  K/mm3


 


RBC  4.27 L   3.91 L  (4.63-6.08)  M/mm3


 


Hgb  12.9 L   11.7 L  (13.7-17.5)  gm/L


 


Hct  37.2 L   34.4 L  (40.1-51.0)  %


 


MCV  87.1   88.0  (79.0-92.2)  fl


 


MCH  30.2   29.9  (25.7-32.2)  pg


 


MCHC  34.7   34.0  (32.2-35.5)  g/dl


 


RDW Std Deviation  43.2   42.3  (35.1-43.9)  fL


 


Plt Count  193   183  (163-337)  K/mm3


 


MPV  9.5   10.3  (9.4-12.3)  fl


 


Neut % (Auto)  61.6   62.5  (34.0-67.9)  %


 


Lymph % (Auto)  22.5   21.9  (21.8-53.1)  %


 


Mono % (Auto)  12.4 H   12.9 H  (5.3-12.2)  %


 


Eos % (Auto)  3.1   2.7  (0.8-7.0)  


 


Baso % (Auto)  0.2   0.0 L  (0.1-1.2)  %


 


Neut # (Auto)  3.54   3.29  (1.78-5.38)  K/mm3


 


Lymph # (Auto)  1.29 L   1.15 L  (1.32-3.57)  K/mm3


 


Mono # (Auto)  0.71   0.68  (0.30-0.82)  K/mm3


 


Eos # (Auto)  0.18   0.14  (0.04-0.54)  K/mm3


 


Baso # (Auto)  0.01   0.00 L  (0.01-0.08)  K/mm3


 


Sodium   141   (136-145)  mEq/L


 


Potassium   3.6   (3.5-5.1)  mEq/L


 


Chloride   106   ()  mEq/L


 


Carbon Dioxide   28   (21-32)  mEq/L


 


Anion Gap   10.6   (5-15)  


 


BUN   20 H   (7-18)  mg/dL


 


Creatinine   1.1   (0.7-1.3)  mg/dL


 


Est Cr Clr Drug Dosing   51.74   mL/min


 


Estimated GFR (MDRD)   > 60   (>60)  mL/min


 


BUN/Creatinine Ratio   18.2 H   (14-18)  


 


Glucose   123 H   ()  mg/dL


 


Calcium   8.3 L   (8.5-10.1)  mg/dL


 


Magnesium   2.0   (1.8-2.4)  mg/dl


 


C-Reactive Protein   < 0.2   (<1.0)  mg/dL














  07/21/17 Range/Units





  05:45 


 


WBC   (4.23-9.07)  K/mm3


 


RBC   (4.63-6.08)  M/mm3


 


Hgb   (13.7-17.5)  gm/L


 


Hct   (40.1-51.0)  %


 


MCV   (79.0-92.2)  fl


 


MCH   (25.7-32.2)  pg


 


MCHC   (32.2-35.5)  g/dl


 


RDW Std Deviation   (35.1-43.9)  fL


 


Plt Count   (163-337)  K/mm3


 


MPV   (9.4-12.3)  fl


 


Neut % (Auto)   (34.0-67.9)  %


 


Lymph % (Auto)   (21.8-53.1)  %


 


Mono % (Auto)   (5.3-12.2)  %


 


Eos % (Auto)   (0.8-7.0)  


 


Baso % (Auto)   (0.1-1.2)  %


 


Neut # (Auto)   (1.78-5.38)  K/mm3


 


Lymph # (Auto)   (1.32-3.57)  K/mm3


 


Mono # (Auto)   (0.30-0.82)  K/mm3


 


Eos # (Auto)   (0.04-0.54)  K/mm3


 


Baso # (Auto)   (0.01-0.08)  K/mm3


 


Sodium  140  (136-145)  mEq/L


 


Potassium  3.3 L  (3.5-5.1)  mEq/L


 


Chloride  106  ()  mEq/L


 


Carbon Dioxide  27  (21-32)  mEq/L


 


Anion Gap  10.3  (5-15)  


 


BUN  13  (7-18)  mg/dL


 


Creatinine  1.1  (0.7-1.3)  mg/dL


 


Est Cr Clr Drug Dosing  51.74  mL/min


 


Estimated GFR (MDRD)  > 60  (>60)  mL/min


 


BUN/Creatinine Ratio  11.8 L  (14-18)  


 


Glucose  122 H  ()  mg/dL


 


Calcium  8.3 L  (8.5-10.1)  mg/dL


 


Magnesium  1.7 L  (1.8-2.4)  mg/dl


 


C-Reactive Protein  0.5  (<1.0)  mg/dL











Med Orders - Current: 


 Current Medications





Acetaminophen (Tylenol)  650 mg PO Q4H PRN


   PRN Reason: Pain (Mild 1-3)/fever


   Last Admin: 07/21/17 03:19 Dose:  650 mg


Hydrocodone Bitart/Acetaminophen (Norco 325-5 Mg)  1 tab PO Q4H PRN


   PRN Reason: Pain (moderate 4-6)


   Last Admin: 07/20/17 05:20 Dose:  1 tab


Albuterol/Ipratropium (Duoneb 3.0-0.5 Mg/3 Ml)  3 ml NEB Q4H PRN


   PRN Reason: Shortness Of Breath/wheezing


Bisacodyl (Dulcolax)  5 mg PO DAILY PRN


   PRN Reason: Constipation


Diltiazem HCl (Diltiazem)  10 mg IVPUSH Q4H PRN


   PRN Reason: Tachycardia


Diltiazem HCl (Cardizem Cd)  180 mg PO DAILY [facility]


   Last Admin: 07/21/17 08:32 Dose:  180 mg


Docusate Sodium (Colace)  100 mg PO BID PRN


   PRN Reason: Constipation


Famotidine (Pepcid)  20 mg PO BID [facility]


   Last Admin: 07/21/17 08:35 Dose:  20 mg


Furosemide (Lasix)  40 mg PO DAILY [facility]


   Last Admin: 07/21/17 08:32 Dose:  40 mg


Hydralazine HCl (Apresoline)  20 mg IVPUSH Q4H PRN


   PRN Reason: Hypertension


Hydrochlorothiazide (Hydrochlorothiazide)  25 mg PO DAILY [facility]


   Last Admin: 07/21/17 08:31 Dose:  25 mg


Hydromorphone HCl (Dilaudid)  1 mg IVPUSH Q4H PRN


   PRN Reason: Pain (severe 7-10)


   Last Admin: 07/20/17 06:09 Dose:  1 mg


Dextrose/Sodium Chloride (Dextrose 5%-Normal Saline)  1,000 mls @ 100 mls/hr IV 

ASDIRECTED [facility]


   Last Admin: 07/21/17 07:46 Dose:  100 mls/hr


Lorazepam (Ativan)  1 mg IVPUSH Q4H PRN


   PRN Reason: Restless legs


   Last Admin: 07/20/17 01:17 Dose:  1 mg


Losartan Potassium (Cozaar)  100 mg PO DAILY [facility]


   Last Admin: 07/21/17 08:34 Dose:  100 mg


Magnesium Oxide (Magnesium Oxide)  400 mg PO Q4H [facility]


   Stop: 07/21/17 12:01


   Last Admin: 07/21/17 11:20 Dose:  400 mg


Magnesium Sulfate (Pharmacy To Dose - Magnesium Replacement)  0 dose .XX 

ASDIRECTED PRN


   PRN Reason: RX TO MONITOR MAG LEVELS


Ondansetron HCl (Zofran)  4 mg IV Q6H PRN


   PRN Reason: Nausea/Vomiting


Polyethylene Glycol (Miralax)  17 gm PO DAILY PRN


   PRN Reason: Constipation


Potassium Chloride (Klor-Con M20)  20 meq PO DAILY [facility]


   Last Admin: 07/21/17 08:33 Dose:  20 meq


Potassium Chloride (Pharmacy To Dose - Potassium Replacement)  0 dose .XX 

ASDIRECTED PRN


   PRN Reason: RX TO MONITOR K LEVELS


Potassium Chloride (Klor-Con M20)  40 meq PO Q4H [facility]


   Stop: 07/21/17 12:01


   Last Admin: 07/21/17 11:20 Dose:  40 meq


Pramipexole Dihydrochloride (Mirapex)  0.75 mg PO 1700 [facility]


   Last Admin: 07/20/17 19:19 Dose:  0.75 mg


Rivaroxaban (Xarelto)  15 mg PO DAILY [facility]


   Last Admin: 07/21/17 08:31 Dose:  15 mg


Senna/Docusate Sodium (Senna Plus)  1 tab PO BID PRN


   PRN Reason: Constipation


Sodium Chloride (Saline Flush)  10 ml FLUSH ASDIRECTED PRN


   PRN Reason: Keep Vein Open


   Last Admin: 07/19/17 19:57 Dose:  10 ml


Temazepam (Restoril)  15 mg PO BEDTIME PRN


   PRN Reason: Sleep


Tramadol HCl (Ultram)  50 mg PO DAILY PRN


   PRN Reason: Moderate Pain 





Discontinued Medications





Clonazepam (Klonopin)  0.5 mg PO ONETIME ONE


   Stop: 07/20/17 02:45


   Last Admin: 07/20/17 02:55 Dose:  0.5 mg


Clonidine HCl (Catapres)  0.1 mg PO ONETIME ONE


   Stop: 07/20/17 02:44


   Last Admin: 07/20/17 02:55 Dose:  0.1 mg


Famotidine (Pepcid)  20 mg IVPUSH BID [facility]


   Last Admin: 07/20/17 14:29 Dose:  20 mg


Famotidine (Pepcid)  20 mg IVPUSH ONETIME ONE


   Stop: 07/19/17 21:54


   Last Admin: 07/20/17 00:13 Dose:  Not Given


Famotidine (Pepcid)  20 mg IVPUSH ONETIME ONE


   Stop: 07/20/17 00:13


   Last Admin: 07/20/17 00:29 Dose:  20 mg


Sodium Chloride (Normal Saline)  1,000 mls @ 75 mls/hr IV ASDIRECTED [facility]


   Last Admin: 07/19/17 19:40 Dose:  75 mls/hr


Sodium Chloride (Normal Saline)  100 mls @ 65 mls/hr IV ASDIRECTED [facility]


   Last Admin: 07/19/17 19:38 Dose:  65 mls/hr


Promethazine HCl 12.5 mg/ (Sodium Chloride)  50.5 mls @ 100 mls/hr IV Q6H PRN


   PRN Reason: Nausea/Vomiting


Magnesium Sulfate 2 gm/ Premix  50 mls @ 25 mls/hr IV ONETIME ONE


   Stop: 07/20/17 04:53


   Last Admin: 07/20/17 03:00 Dose:  25 mls/hr


Sodium Chloride (Normal Saline) Confirm Administered Dose 50 mls @ as directed 

.ROUTE .STK-MED ONE


   Stop: 07/20/17 20:58


   Last Admin: 07/20/17 21:03 Dose:  Not Given


Iopamidol (Isovue-370 (76%))  100 ml IVPUSH ONETIME ONE


   Stop: 07/19/17 19:08


   Last Admin: 07/19/17 19:38 Dose:  100 ml


Iopamidol (Isovue-370 (76%))  50 ml IVPUSH ONETIME ONE


   Stop: 07/19/17 19:08


   Last Admin: 07/19/17 19:38 Dose:  50 ml


Lorazepam (Ativan)  0.5 mg IVPUSH ONETIME ONE


   Stop: 07/19/17 19:54


   Last Admin: 07/19/17 19:57 Dose:  0.5 mg


Lorazepam (Ativan)  1 mg IVPUSH ONETIME ONE


   Stop: 07/19/17 21:06


   Last Admin: 07/19/17 21:09 Dose:  1 mg


Lorazepam (Ativan)  0.5 mg IVPUSH ONETIME ONE


   Stop: 07/20/17 03:09


   Last Admin: 07/20/17 03:15 Dose:  0.5 mg


Metoclopramide HCl (Reglan)  10 mg IVPUSH Q6H [facility]


   Last Admin: 07/20/17 00:28 Dose:  10 mg


Non-Formulary Medication (Ranitidine)  150 mg PO DAILY [facility]


Non-Formulary Medication (Rivaroxaban)  15 mg PO DAILY [facility]


Potassium Chloride (Klor-Con M20)  20 meq PO Q3H [facility]


   Stop: 07/20/17 02:01


   Last Admin: 07/20/17 02:46 Dose:  Not Given


Pramipexole Dihydrochloride (Mirapex)  0.75 mg PO BEDTIME [facility]


Pramipexole Dihydrochloride (Mirapex)  0.75 mg PO ONETIME ONE


   Stop: 07/20/17 00:31


   Last Admin: 07/20/17 00:37 Dose:  0.75 mg


Rivaroxaban (Xarelto)  15 mg PO BID [facility]


Ropinirole HCl (Requip)  0.25 mg PO ONETIME ONE


   Stop: 07/20/17 02:21


   Last Admin: 07/20/17 02:48 Dose:  0.25 mg


Sodium Chloride (Saline Flush)  10 ml FLUSH ONETIME PRN


   PRN Reason: IV FLUSH


   Last Admin: 07/19/17 19:39 Dose:  10 ml











- Exam


Quality Assessment: DVT prophylaxis


General: alert, oriented, cooperative, no acute distress


HEENT: Pupils equal, Pupils reactive, EOMI


Neck: supple, trachea midline, no JVD


Lungs: Normal Respiratory Effort, Decreased Breath Sounds


Cardiovascular: Regular Rate, Regular Rhythm


GI/Abdominal Exam: Normal Bowel Sounds, Soft, Non-Tender, No Organomegaly


 (Male) Exam: Deferred


Back Exam: Normal Inspection


Extremities: Normal Inspection


Skin: warm, dry


Neurological: no new focal deficit


Psy/Mental Status: alert, normal affect, normal mood





- Problem List & Annotations


(1) Small bowel obstruction due to adhesions


SNOMED Code(s): 499149497


   Code(s): K56.5 - INTESTINAL ADHESIONS W OBST (POSTPROCEDURAL) (POSTINFECTION

)   Status: Acute   Current Visit: Yes   





- Problem List Review


Problem List Initiated/Reviewed/Updated: Yes





- Plan


Plan:: 


Assessment/Plan:


Acute:


SBO


   - CT Scan: Distal Small Bowel Obstruction


   - No Hx/o GI Surgeries


   - Supportive Care


   - NGT placed in ED; removed has tolerated full liquids for breakfast


   - Prokinetic Agent (cautioned family, might worsen his restless leg)


   - Ambulated TID as tolerated


   - Repeat Abn/Pelvis CTA





Mild Hypokalemia


   - K 3.4 likley from diuretic +/- GI Loss


   - Will replete for now


   - Pharmacy to replete and monitor starting tomorrow





Multiple Osteoclerotic Metastasis


   - Hx/o Colon/Prostate Cancer


   - CA 10.2 and Alk Phos of 131 





Acute on Chronic RLS 


   - Had reglan last night and chaitanya reversed his mirapex


   - He had gotten ropironole, clonidine and klonopin on top of ativan overnight





Chronic:


HTN


HLD


OA


Hiatial Hernia


RLS


TIA


Hx/o Intestinal Metaplasia of gastric mucosa


Schatzkis Ring


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


Hx/o Colon and Prostate CA


Hx/o Melanoma





Plan:


Patient wants to go home, but need to continue to advance diet;  discussed on 

rounds with team present


Fulliquids--->advance as tolerated


Repeat labs this afternoon


Consider GS consult for worsening of symptoms


PT/OT consult: hold off for now


SW/CM for d/c planning


Code status: 1


Additional orders as above








LOS<96 hours is expected

## 2017-07-21 NOTE — CR
Abdomen: Supine view of the abdomen was obtained.

 

Comparison: Previous abdominal and pelvic CT study of 07/19/17, 

previous abdominal x-ray of 12/27/12.

 

Sclerotic areas are scattered within the pelvis and within both hips 

as well as within the ribs compatible with osteoblastic metastasis.  

Degenerative change is noted within the spine.  Bowel gas pattern is 

normal.  Radiation implant seeds are seen within the prostate gland.  

Calcification is seen within the left side of the abdomen which appear

 stable from prior x-ray.

 

Impression:

1.  Osteoblastic metastasis again seen.

2.  Other incidental findings as described above.

 

Diagnostic code #3s

## 2017-09-11 NOTE — EDM.PDOC
ED HPI GENERAL MEDICAL PROBLEM





- General


Chief Complaint: Abdominal Pain


Stated Complaint: constipation poss blockage


Time Seen by Provider: 09/11/17 04:23


Source of Information: Reports: Patient


History Limitations: Reports: No Limitations





- History of Present Illness


INITIAL COMMENTS - FREE TEXT/NARRATIVE: 





77-year-old male presents to the ED with diffuse abdominal cramping pain almost 

all infraumbilical. Cramping is been not that it causes him to vomit. He 

estimates he's vomited about 6 times in the last 24 hours. He feels somewhat 

better after he had set in emesis. He ate some dinner but he believes he 

vomited back up about 2 hours later yesterday. He just vomited before coming 

into the emergency room. Emesis has been bilious and dark. Ports she's not 

passing any flatus per rectum for the last several hours. No bowel movement for 

4 days. History of constipation. No previous abdominal surgeries. Had a similar 

type problem in July of this year where he was severely constipated. Started 

MiraLAX powder a few days ago but has not made his bowels work. Tries to drink 

plenty of water. Unable to sleep this evening due to the cramping nature of the 

pain. Current pain is listed as 6 out of 10.


Onset: Gradual (Abdominal pain with nausea and vomiting for 2 days.), Other (No 

bowel movement for 4 days.)


Onset Date: 09/09/17


Duration: Hour(s):, Getting Worse, Waxing/Waning


Location: Reports: Abdomen


Quality: Reports: Ache, Sharp, Stabbing


Severity: Moderate (Current pain is listed as 8 out of 10.)


Improves with: Reports: None


Worsens with: Reports: Eating


Context: Denies: Activity, Exercise, Lifting, Sick Contact, Trauma, Other


Associated Symptoms: Reports: Diaphoresis, Fever/Chills (Associate with the 

vomiting.), Loss of Appetite, Malaise, Nausea/Vomiting (Intractable.), 

Weakness.  Denies: Confusion, Chest Pain, Cough, cough w sputum, Headaches ( 

Chills with vomiting.), Rash, Seizure, Shortness of Breath, Syncope


Treatments PTA: Reports: Other (see below) (9)


  ** Abdomen


Pain Score (Numeric/FACES): 3





- Related Data


 Allergies











Allergy/AdvReac Type Severity Reaction Status Date / Time


 


metoprolol AdvReac  Anxiety Verified 07/20/17 07:03


 


rosuvastatin [From Crestor] AdvReac  Anxiety Verified 07/20/17 07:03


 


Statins-Hmg-Coa Reductase AdvReac  Leg Cramps Verified 07/20/17 07:03





Inhibitor     











Home Meds: 


 Home Meds





Ranitidine [Zantac] 150 mg PO DAILY 03/04/16 [History]


Diltiazem [Cardizem CD] 180 mg PO DAILY 07/19/17 [History]


Furosemide [Lasix] 40 mg PO DAILY 07/19/17 [History]


Potassium Chloride [Klor-Con] 20 meq PO DAILY 07/19/17 [History]


Rivaroxaban [Xarelto] 15 mg PO DAILY 07/19/17 [History]


Losartan/Hydrochlorothiazide [Losartan-HCTZ 100-25 MG] 25 - 100 mg PO DAILY 07/ 20/17 [History]


Pramipexole Di-HCl [Mirapex] 0.75 mg PO BEDTIME 07/20/17 [History]


traMADol [Ultram] 50 mg PO DAILY PRN 07/20/17 [History]


Ondansetron [Zofran ODT] 4 mg PO Q6H #8 tab.dis 09/11/17 [Rx]











Past Medical History





- Past Health History


Medical/Surgical History: Denies Medical/Surgical History


HEENT History: Reports: None


Cardiovascular History: Reports: High Cholesterol, Hypertension


Respiratory History: Reports: PE


Other Respiratory History: Lung and bilateral leg blood clot in  2016-- on 

Xarrelto for this reason.


Gastrointestinal History: Reports: Chronic Constipation, Hiatal Hernia


Other Gastrointestinal History: intestinal polyp, intestinal  metaplasia of 

gastric mucosa, schatzkis ring


Genitourinary History: Reports: BPH, Prostate Disorder (Cancer of the prostate 

with previous cesium seed implantation. Known diffuse osteoblastic metastatic 

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


OB/GYN History: Reports: None


Musculoskeletal History: Reports: Osteoarthritis


Other Musculoskeletal History: L shoulder osteoarthritis, restless leg syndrome


Neurological History: Reports: TIA


Psychiatric History: Reports: None


Endocrine/Metabolic History: Reports: None


Hematologic History: Reports: None


Immunologic History: Reports: None


Oncologic (Cancer) History: Reports: Prostate


Dermatologic History: Reports: Melanoma


Other Dermatologic History: Melanoma-neck





- Infectious Disease History


Infectious Disease History: Reports: Shingles





- Past Surgical History


Oncologic Surgical History: Reports: Other (See Below)





Social & Family History





- Family History


HEENT: Reports: Hearing Impairment


Other HEENT Family History: Mother


Cardiac: Reports: Heart Failure, Pacemaker


Other Cardiac Family History: brother-pacemaker.  mother- HF


Respiratory: Reports: None


GI: Reports: None


Musculoskeletal: Reports: Arthritis


Neurological: Reports: Alzheimers Disease


Other Neurological Family History: Dad-alzheimer's


Psychiatric: Reports: None





- Tobacco Use


Smoking Status *Q: Former Smoker


Years of Tobacco use: 5


Packs/Tins Daily: 1


Used Tobacco, but Quit: No


Month Tobacco Last Used: 1973


Second Hand Smoke Exposure: Yes





- Caffeine Use


Caffeine Use: Reports: Coffee


Other Caffeine Use: Drinks 3-4 cups every morning





- Recreational Drug Use


Recreational Drug Use: No


Drug Use in Last 12 Months: No





- Living Situation & Occupation


Living situation: Reports: 


Occupation: Retired





ED ROS GENERAL





- Review of Systems


Review Of Systems: See Below


Constitutional: Reports: Chills, Malaise, Weakness, Fatigue, Diaphoresis (With 

vomiting), Decreased Appetite.  Denies: Fever


HEENT: Reports: Glasses, Hearing Loss (Mild.)


Respiratory: Denies: Shortness of Breath, Wheezing, Pleuritic Chest Pain, Cough

, Sputum, Hemoptysis


Cardiovascular: Reports: Dyspnea on Exertion (Sometimes), Palpitations.  Denies

: Chest Pain, Blood Pressure Problem, Claudication, Edema, Lightheadedness, 

Orthopnea


Endocrine: Reports: Fatigue (Occasionally)


GI/Abdominal: Reports: Abdominal Pain, Constipation, Nausea, Vomiting.  Denies: 

Diarrhea, Flatus (See history of present illness)


: Reports: Frequency, Other (Nocturia usually 3.)


Musculoskeletal: Reports: Shoulder Pain, Back Pain


Skin: Reports: No Symptoms


Neurological: Reports: Dizziness (Gets quite dizzy so she with the vomiting.)


Psychiatric: Reports: No Symptoms


Hematologic/Lymphatic: Reports: No Symptoms





ED EXAM, GI/ABD





- Physical Exam


Exam: See Below


Exam Limited By: No Limitations


General Appearance: Alert, WD/WN, No Apparent Distress


Eyes: Bilateral: Normal Appearance (No jaundice)


Throat/Mouth: Normal Inspection, Normal Lips, Normal Oropharynx


Head: Atraumatic, Normocephalic


Neck: Normal Inspection, Supple, Non-Tender, Full Range of Motion.  No: 

Lymphadenopathy (L), Lymphadenopathy (R)


Respiratory/Chest: No Respiratory Distress, Lungs Clear, Normal Breath Sounds, 

No Accessory Muscle Use


Cardiovascular: No Edema, No Gallop, No Murmur, No Rub.  No: Normal Peripheral 

Pulses


GI/Abdominal Exam: Distended (Minimally distended and minimally tympanitic to 

percussion upper abdomen), Abnormal Bowel Sounds (Diffuse hyperactive bowel 

sounds.).  No: Hepatomegaly, Splenomegaly


 (Male) Exam: No Hernia


Extremities: Normal Inspection, Normal Range of Motion, Non-Tender, No Pedal 

Edema


Neurological: Alert, Oriented, CN II-XII Intact, Normal Cognition


Psychiatric: Normal Affect, Normal Mood


Skin Exam: Warm, Dry, Intact, Normal Color, No Rash





EKG INTERPRETATION


EKG Date: 09/11/17


Time: 04:35


Rhythm: NSR


Rate (Beats/Min): 75


Axis: Normal


P-Wave: Present


QRS: Other (There is early R-wave transition suggestive of right ventricular 

hypertrophy septal hypertrophy pattern.)


ST-T: Depressed (Mild ST segment depression in V3 to V6 lead 1 and 2.)


QT: Prolonged (Moderately prolonged at 453.)





Course





- Vital Signs


Last Recorded V/S: 


 Last Vital Signs











Temp  36.4 C   09/11/17 04:11


 


Pulse  88   09/11/17 04:11


 


Resp  16   09/11/17 04:11


 


BP      


 


Pulse Ox  99   09/11/17 04:11














- Orders/Labs/Meds


Orders: 


 Active Orders 24 hr











 Category Date Time Status


 


 EKG Documentation Completion [RC] STAT Care  09/11/17 04:23 Active


 


 Enema [RC] ASDIRECTED Care  09/11/17 05:10 Active


 


 Abdomen 1V Flat [CR] Stat Exams  09/11/17 04:23 Taken


 


 URINALYSIS W/MICROSCOPIC [UA W/MICROSCOPIC] [URIN] Stat Lab  09/11/17 04:26 

Uncollected


 


 Dextrose 5%-0.9% NaCl [Dextrose 5%-Normal Saline] 1,000 Med  09/11/17 04:30 

Active





 ml   





 IV ASDIRECTED   








 Medication Orders





Dextrose/Sodium Chloride (Dextrose 5%-Normal Saline)  1,000 mls @ 250 mls/hr IV 

ASDIRECTED JONY


   Last Admin: 09/11/17 04:31  Dose: 250 mls/hr








Labs: 


 Laboratory Tests











  09/11/17 09/11/17 09/11/17 Range/Units





  04:15 04:15 04:15 


 


WBC  8.87    (4.23-9.07)  K/mm3


 


RBC  4.77    (4.63-6.08)  M/mm3


 


Hgb  14.4    (13.7-17.5)  gm/L


 


Hct  40.8    (40.1-51.0)  %


 


MCV  85.5    (79.0-92.2)  fl


 


MCH  30.2    (25.7-32.2)  pg


 


MCHC  35.3    (32.2-35.5)  g/dl


 


RDW Std Deviation  41.9    (35.1-43.9)  fL


 


Plt Count  247    (163-337)  K/mm3


 


MPV  10.4    (9.4-12.3)  fl


 


Neutrophils % (Manual)  76 H    (40-60)  %


 


Band Neutrophils %  1    (0-10)  %


 


Lymphocytes % (Manual)  14 L    (20-40)  %


 


Atypical Lymphs %  0    %


 


Monocytes % (Manual)  8    (2-10)  %


 


Eosinophils % (Manual)  1    (0.8-7.0)  %


 


Basophils % (Manual)  0 L    (0.2-1.2)  


 


Platelet Estimate  Adequate    


 


RBC Morph Comment  Normal    


 


PT   11.9   (8.0-13.0)  SECONDS


 


INR   1.09   


 


Sodium    141  (136-145)  mEq/L


 


Potassium    3.3 L  (3.5-5.1)  mEq/L


 


Chloride    101  ()  mEq/L


 


Carbon Dioxide    29  (21-32)  mEq/L


 


Anion Gap    14.3  (5-15)  


 


BUN    25 H  (7-18)  mg/dL


 


Creatinine    1.2  (0.7-1.3)  mg/dL


 


Est Cr Clr Drug Dosing    46.67  mL/min


 


Estimated GFR (MDRD)    58  (>60)  mL/min


 


BUN/Creatinine Ratio    20.8 H  (14-18)  


 


Glucose    176 H  ()  mg/dL


 


Calcium    9.9  (8.5-10.1)  mg/dL


 


Magnesium    2.0  (1.8-2.4)  mg/dl


 


Total Bilirubin    0.9  (0.2-1.0)  mg/dL


 


AST    25  (15-37)  U/L


 


ALT    32  (16-63)  U/L


 


Alkaline Phosphatase    138 H  ()  U/L


 


Troponin I     (0.00-0.056)  ng/mL


 


C-Reactive Protein    0.5  (<1.0)  mg/dL


 


NT-Pro-B Natriuret Pep    265  (0-450)  pg/mL


 


Total Protein    7.6  (6.4-8.2)  g/dl


 


Albumin    4.0  (3.4-5.0)  g/dl


 


Globulin    3.6  gm/dL


 


Albumin/Globulin Ratio    1.1  (1-2)  


 


Lipase    52 L  ()  U/L














  09/11/17 Range/Units





  04:15 


 


WBC   (4.23-9.07)  K/mm3


 


RBC   (4.63-6.08)  M/mm3


 


Hgb   (13.7-17.5)  gm/L


 


Hct   (40.1-51.0)  %


 


MCV   (79.0-92.2)  fl


 


MCH   (25.7-32.2)  pg


 


MCHC   (32.2-35.5)  g/dl


 


RDW Std Deviation   (35.1-43.9)  fL


 


Plt Count   (163-337)  K/mm3


 


MPV   (9.4-12.3)  fl


 


Neutrophils % (Manual)   (40-60)  %


 


Band Neutrophils %   (0-10)  %


 


Lymphocytes % (Manual)   (20-40)  %


 


Atypical Lymphs %   %


 


Monocytes % (Manual)   (2-10)  %


 


Eosinophils % (Manual)   (0.8-7.0)  %


 


Basophils % (Manual)   (0.2-1.2)  


 


Platelet Estimate   


 


RBC Morph Comment   


 


PT   (8.0-13.0)  SECONDS


 


INR   


 


Sodium   (136-145)  mEq/L


 


Potassium   (3.5-5.1)  mEq/L


 


Chloride   ()  mEq/L


 


Carbon Dioxide   (21-32)  mEq/L


 


Anion Gap   (5-15)  


 


BUN   (7-18)  mg/dL


 


Creatinine   (0.7-1.3)  mg/dL


 


Est Cr Clr Drug Dosing   mL/min


 


Estimated GFR (MDRD)   (>60)  mL/min


 


BUN/Creatinine Ratio   (14-18)  


 


Glucose   ()  mg/dL


 


Calcium   (8.5-10.1)  mg/dL


 


Magnesium   (1.8-2.4)  mg/dl


 


Total Bilirubin   (0.2-1.0)  mg/dL


 


AST   (15-37)  U/L


 


ALT   (16-63)  U/L


 


Alkaline Phosphatase   ()  U/L


 


Troponin I  < 0.017  (0.00-0.056)  ng/mL


 


C-Reactive Protein   (<1.0)  mg/dL


 


NT-Pro-B Natriuret Pep   (0-450)  pg/mL


 


Total Protein   (6.4-8.2)  g/dl


 


Albumin   (3.4-5.0)  g/dl


 


Globulin   gm/dL


 


Albumin/Globulin Ratio   (1-2)  


 


Lipase   ()  U/L











Meds: 


Medications











Generic Name Dose Route Start Last Admin





  Trade Name Freq  PRN Reason Stop Dose Admin


 


Dextrose/Sodium Chloride  1,000 mls @ 250 mls/hr  09/11/17 04:30  09/11/17 04:31





  Dextrose 5%-Normal Saline  IV   250 mls/hr





  ASDIRECTED JONY   Administration














Discontinued Medications














Generic Name Dose Route Start Last Admin





  Trade Name Freq  PRN Reason Stop Dose Admin


 


Hydromorphone HCl  0.5 mg  09/11/17 04:25  09/11/17 04:31





  Dilaudid  IVPUSH  09/11/17 04:26  0.5 mg





  ONETIME ONE   Administration


 


Ondansetron HCl  4 mg  09/11/17 04:25  09/11/17 04:31





  Zofran  IVPUSH  09/11/17 04:26  4 mg





  ONETIME ONE   Administration














- Radiology Interpretation


Free Text/Narrative:: 





77-year-old male presents the ED with diffuse abdominal cramping pain with 

associated nausea vomiting 2 days. Has vomited about 6 times in last 24 hours. 

Emesis is been primarily bilious or recently eaten food. Last meal was dinner 

time yesterday which she vomited up about an hour later. Cramps are intense and 

almost all infraumbilical. He doesn't believe he is passing any flatus for the 

last 6-8 hours. States he does feel better after vomiting is relieve some of 

his abdominal pain. Emesis is all been bilious without blood. Similar type 

problem in July of this year. History of chronic constipation with no bowel 

movement for 4 days. No previous abdominal surgeries. Examination reveals 

normal vital signs with hyperactive bowel sounds throughout. Minimally 

distended and minimal minimally tympanitic to percussion. Plan IV D5 normal 

saline at 250 mils per hour. Given Dilaudid 0.5 mg IV with Zofran 4 mg IV. One 

view of the abdomen to be done. Routine labs including a lipase to be done.





- Re-Assessments/Exams


Free Text/Narrative Re-Assessment/Exam: 





09/11/17 04:42 ECG shows sinus rhythm at 75/m. There is early R-wave transition 

suggesting right ventricular hypertrophy pattern consider cor pulmonale/ 

pulmonary hypertension.





09/11/17 05:03 2 views of the abdomen have been obtained. They reveal numerous 

dilated loops of small bowel without air-fluid levels to suggest obstruction. 

There is a fair amount of stool within the left hemicolon --very little within 

the rectal vault. Cesium seeds are well appreciated in the prostate gland area 

in the pelvis. He has osteoblastic lesions with sclerotic lesions throughout 

the pelvis, ribs and spine compatible with metastatic cancer of the prostate. 

We can try a soapsuds enema although I'm not convinced will get a lot of 

return. Almost looks like an ileus type pattern or early small bowel 

obstruction pattern with no so many dilated loops of small bowel..





09/11/17 05:12 patient reports he feels better with the current medications 

that he's received. Will therefore go ahead with a soapsuds enema.





09/11/17 05:20 reviewed his last admission and appears that with nasogastric 

tube placement and drainage for a day or 2 his bowels open up and he started to 

have normal bowel movements.  CT of the abdomen done at that time with 

intravenous contrast only. There was some question as to whether or not he may 

have some pelvic adhesions after radiation to his prostate plus the cesium seed 

implants to cause a distal small bowel partial obstruction. There was some air 

in the rectal vault on that CT. total white count is 8.87 with 76% neutrophils 1

% bands reported. Hemoglobin is normal at 14.4 with hematocrit of 40.8. Please 

normal at 247,000. Coags show a PT of 11.9 and INR 1.09.





09/11/17 05:40 Patient did have very good results with the soapsuds enema. 

Passing large amount of stool and flatus. Feels much improved with no abdominal 

pain or nausea at this time. I'm therefore going to allow him to go home. He 

will drink plenty of fluids the day and minimize his oral food intake to more 

of a soft diet. I advised him that MiraLAX 1 scoop daily is indicated to keep 

his bowels regular. He was started on this by Dr. Greenberg last week. He was 

not aware that he can take it every day. He seems to drink plenty of fluids so 

this should not be a major problem. I will give him a prescription for Zofran 

sublingual to be taken when necessary for nausea. Lab work revealed a slightly 

low potassium at 3.3 slightly elevated blood sugar at 176. Serum calcium is in 

the normal range considering bony metastatic disease. Alk phosphatase is mildly 

elevated at 138. NP mild mildly elevated at 265. 














Departure





- Departure


Time of Disposition: 05:43


Disposition: Home, Self-Care 01


Condition: Fair


Clinical Impression: 


 Constipation by delayed colonic transit





Abdominal pain


Qualifiers:


 Abdominal location: lower abdomen, unspecified Qualified Code(s): R10.30 - 

Lower abdominal pain, unspecified





Vomiting


Qualifiers:


 Vomiting type: bilious vomiting Nausea presence: with nausea Qualified Code(s)

: R11.14 - Bilious vomiting








- Discharge Information


Prescriptions: 


Ondansetron [Zofran ODT] 4 mg PO Q6H #8 tab.dis


Referrals: 


Davida Greenberg MD [Primary Care Provider] - 


Forms:  ED Department Discharge


Additional Instructions: 


Evaluation in the emergency department this morning in regards to diffuse 

abdominal cramping pain particularly in the lower abdomen associated with 

intermittent nausea and vomiting over the last 24 hours. Concerns for bowel 

obstruction existed as you were not passing any flatus for the last several 

hours. Also reported no bowel movement for 4 days. X-rays of the abdomen 

revealed increased stool throughout the left hemicolon and down into the rectal 

vault. There were numerous dilated loops of small bowel as well which contain 

mostly air without a lot of fluid and no true signs of a bowel obstruction. Lab 

work showed normal white blood cell count with no signs of infection. Potassium 

was slightly low at 3.3 but this will be corrected once you're back on regular 

diet. Serum calcium levels were normal. Sometimes when there is cancer in the 

bones the calcium levels can become very high which can cause constipation. You 

had good results with a soapsuds enema with passage of the large amount of 

flatus which I think will relieve the abdominal pain. Suggest a lot of fluids 

today with soft diet. May use Zofran 4 mg under the tongue every 6 hours if 

necessary for nausea relief. Also suggest continue MiraLAX powder 17 g or 1 

scoop daily to prevent constipation issues from occurring in the future. Follow-

up with personal doctor if any further problem's occur.





- My Orders


Last 24 Hours: 


My Active Orders





09/11/17 04:23


EKG Documentation Completion [RC] STAT 


Abdomen 1V Flat [CR] Stat 





09/11/17 04:26


URINALYSIS W/MICROSCOPIC [UA W/MICROSCOPIC] [URIN] Stat 





09/11/17 04:30


Dextrose 5%-0.9% NaCl [Dextrose 5%-Normal Saline] 1,000 ml IV ASDIRECTED 





09/11/17 05:10


Enema [RC] ASDIRECTED 














- Assessment/Plan


Last 24 Hours: 


My Active Orders





09/11/17 04:23


EKG Documentation Completion [RC] STAT 


Abdomen 1V Flat [CR] Stat 





09/11/17 04:26


URINALYSIS W/MICROSCOPIC [UA W/MICROSCOPIC] [URIN] Stat 





09/11/17 04:30


Dextrose 5%-0.9% NaCl [Dextrose 5%-Normal Saline] 1,000 ml IV ASDIRECTED 





09/11/17 05:10


Enema [RC] ASDIRECTED

## 2017-09-11 NOTE — CR
Abdomen: Supine view of the abdomen was obtained.

 

Comparison: Previous abdominal x-ray of 07/21/17.

 

Scattered degenerative change is noted within the spine.  Radiation 

implant seeds are seen within the prostate gland.  Several scattered 

sclerotic areas are seen within the pelvis and left hip which remains 

stable.  Bowel gas pattern is normal.

 

Impression:

1.  Incidental findings.  Nothing acute is appreciated.

 

Diagnostic code #2

## 2017-12-11 NOTE — PCM.OPNOTE
- General Post-Op/Procedure Note


Date of Surgery/Procedure: 12/11/17


Operative Procedure(s): EGD with bx/colonoscopy to AS colon


Pre Op Diagnosis: GERD/change in bowel habits


Post-Op Diagnosis: Same


Anesthesia Technique: MAC


Primary Surgeon: Hans Lange


EBL in mLs: 0


Complications: None


Condition: Good

## 2017-12-11 NOTE — OR
DATE OF OPERATION:  12/11/2017

 

SURGEON:  Hans Lange MD

 

PREOPERATIVE DIAGNOSIS:

Change in bowel habits.

 

POSTOPERATIVE DIAGNOSIS:

Change in bowel habits.

 

OPERATION PERFORMED:  Colonoscopy to the ascending colon.

 

FINDINGS:

Moderate sigmoid diverticulosis.  There were no angiodysplasias, neoplasias,

large tumor, masses, ulcerations, or hemorrhoids.

 

ANESTHESIA:

Procedure done under IV sedation.

 

DESCRIPTION OF PROCEDURE:

The patient was taken to the endoscopy room, having been connected to monitoring

equipment, and given IV sedation for upper GI endoscopy.  This was continued for

colonoscopy, placed in the left lateral position.  Perianal area was inspected

and was normal.  Rectal exam showed good sphincter tone.  A video Olympus

colonoscope was then introduced into the rectum and threaded up without a

problem to the ascending colon.  Because of a loop, it could not be advanced

into the cecum.  Prep was adequate.  [place] cleansing score grade B.  It was

slowly withdrawn showing the ascending colon, transverse colon, descending

colon, sigmoid colon, and rectum.  The above noted was found.  The patient

tolerated the procedure and was sent to recovery room in a stable condition, and

will be followed up in the clinic.

 

ESTIMATED BLOOD LOSS:

 

 

DD:  12/11/2017 09:22:03

DT:  12/11/2017 09:46:24  MMODAL

Job #:  [id number]

## 2017-12-11 NOTE — OR
DATE OF OPERATION:  12/11/2017

 

SURGEON:  Hans Lange MD

 

PREOPERATIVE DIAGNOSIS:

Gastroesophageal reflux disease.

 

POSTOPERATIVE DIAGNOSIS:

Gastroesophageal reflux disease.

 

OPERATION PERFORMED:  Esophagogastroduodenoscopy with biopsy.

 

FINDINGS:

GE junction located at 38 cm with some superficial erosions and scarring in this

area.  Biopsies were taken.  Scope was passed into the stomach without problem.

The second portion of the duodenum, duodenal bulb, pyloric channel, antrum, body

and cardia of the stomach were unremarkable.  J-maneuver showed a mild

incompetence of the hiatus.  Balance of the esophagus was unremarkable.

 

ANESTHESIA:

Under IV sedation.

 

DESCRIPTION OF PROCEDURE:

The patient was taken to the endoscopy room, placed in a supine position,

connected to monitoring equipment, given IV sedation, and placed in a left

lateral position.  Bite block was inserted and video Olympus gastroscope was

placed in the posterior oropharynx, under direct vision, threaded past the

cricopharyngeus, down the esophagus and into the stomach.  The stomach was

insufflated, and the scope was passed through the pylorus to the second portion

of the duodenum, then slowly withdrawn showing normal second portion of the

duodenum, duodenal bulb, and pyloric channel.  Antrum, body, and cardia of the

stomach were unremarkable.  J-maneuver showed fundus to be normal and mild

incompetence of the hiatus.  Biopsies of the antrum were taken.  Scope was

withdrawn to the GE junction and showed some erosions and scarring, and this was

biopsied multiple times.  The rest of the esophagus viewed as the scope

withdrawn was unremarkable.  The patient tolerated the procedure and IV sedation

continued for colonoscopy.

 

ESTIMATED BLOOD LOSS:

 

 

DD:  12/11/2017 08:53:10

DT:  12/11/2017 09:39:51  MMODAL

Job #:  [id number]

## 2017-12-11 NOTE — PCM.PREANE
Preanesthetic Assessment





- Procedure


Proposed Procedure: 





Diagnostic EGD/ Colonoscopy 





- Anesthesia/Transfusion/Family Hx


Anesthesia History: No Prior Anesthesia


Family History of Anesthesia Reaction: No


Transfusion History: No Prior Transfusion(s)


Intubation History: Unknown





- Review of Systems


General: No Symptoms


Pulmonary: No Symptoms


Cardiovascular: Other (HTN )


Gastrointestinal: Other (GERD, HX SBO )


Neurological: No Symptoms


Other: Reports: None





- Physical Assessment


NPO Status Date: 12/10/17


NPO Status Time: 19:45


O2 Sat by Pulse Oximetry: 97


Respiratory Rate: 16


Vital Signs: 





 Last Vital Signs











Temp  36.8 C   12/11/17 07:25


 


Pulse  67   12/11/17 07:25


 


Resp  16   12/11/17 07:25


 


BP  173/70 H  12/11/17 07:25


 


Pulse Ox  97   12/11/17 07:25











Height: 1.8 m


Weight: 83.461 kg


ASA Class: 3


Mental Status: Alert & Oriented x3


Airway Class: Mallampati = 3


Dentition: Reports: Normal Dentition


Thyro-Mental Finger Breadths: 3


Mouth Opening Finger Breadths: 3


ROM/Head Extension: Full


Lungs: Clear to Auscultation, Normal Respiratory Effort


Cardiovascular: Regular Rate, Regular Rhythm





- Allergies


Allergies/Adverse Reactions: 


 Allergies











Allergy/AdvReac Type Severity Reaction Status Date / Time


 


metoprolol AdvReac  Anxiety Verified 07/20/17 07:03


 


rosuvastatin [From Crestor] AdvReac  Anxiety Verified 07/20/17 07:03


 


Statins-Hmg-Coa Reductase AdvReac  Leg Cramps Verified 07/20/17 07:03





Inhibitor     














- Blood


Blood Available: No


Product(s) Available: None





- Anesthesia Plan


Pre-Op Medication Ordered: None


Beta Blocker: Metoprolol


Med Last Dose Date: 12/11/17


Med Last Dose Time: 05:30





- Acknowledgements


Anesthesia Type Planned: MAC


Pt an Appropriate Candidate for the Planned Anesthesia: Yes


Alternatives and Risks of Anesthesia Discussed w Pt/Guardian: Yes


Pt/Guardian Understands and Agrees with Anesthesia Plan: Yes





PreAnesthesia Questionnaire





- Past Health History


Medical/Surgical History: Denies Medical/Surgical History


HEENT History: Reports: None


Cardiovascular History: Reports: Angina, Blood Clots/VTE/DVT, CAD, High 

Cholesterol, Hypertension


Other Cardiovascular History: peripheral edema


Respiratory History: Reports: PE, Other (See Below)


Other Respiratory History: lung nodules


Gastrointestinal History: Reports: Bowel Obstruction, Colon Polyp, 

Diverticulosis, Gastritis, GERD, Helicobacter Pylori, Hiatal Hernia


Other Gastrointestinal History: esophagitis


Genitourinary History: Reports: Prostate Disorder


OB/GYN History: Reports: None


Musculoskeletal History: Reports: Osteoarthritis


Other Musculoskeletal History: restless leg syndrome


Neurological History: Reports: TIA


Psychiatric History: Reports: None


Endocrine/Metabolic History: Reports: None


Hematologic History: Reports: Anemia


Immunologic History: Reports: None


Oncologic (Cancer) History: Reports: Metastatic, Prostate


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


Dermatologic History: Reports: Melanoma


Other Dermatologic History: removal of melanoma





- Infectious Disease History


Infectious Disease History: Reports: Shingles





- Past Surgical History


GI Surgical History: Reports: Colonoscopy, EGD


Other Musculoskeletal Surgeries/Procedures:: osteoarthritis of left shoulder





- SUBSTANCE USE


Smoking Status *Q: Former Smoker


Tobacco Use Within Last Twelve Months: No


Second Hand Smoke Exposure: Yes


Recreational Drug Use History: No





- HOME MEDS


Home Medications: 


 Home Meds





Ranitidine [Zantac] 150 mg PO DAILY 03/04/16 [History]


Rivaroxaban [Xarelto] 15 mg PO DAILY 07/19/17 [History]


Losartan/Hydrochlorothiazide [Losartan-HCTZ 100-25 MG] 25 - 100 mg PO DAILY 07/ 20/17 [History]


Pramipexole Di-HCl [Mirapex] 0.75 mg PO BEDTIME 07/20/17 [History]


traMADol [Ultram] 50 mg PO DAILY PRN 07/20/17 [History]


Leuprolide [Lupron Depot 3-Month] 1 injection IM ASDIRECTED 12/08/17 [History]


Nitroglycerin [Nitrostat] 1 tab SL ASDIRECTED PRN 12/08/17 [History]


Pramipexole [Mirapex] 0.25 mg PO DAILY 12/08/17 [History]











- CURRENT (IN HOUSE) MEDS


Current Meds: 





 Current Medications





Lactated Ringer's (Ringers, Lactated)  1,000 mls @ 125 mls/hr IV ASDIRECTED JONY


Lidocaine/Sodium Bicarbonate (Buffered Lidocaine 1% In Ns 8.4%)  0.25 ml IV 

ONETIME PRN


   PRN Reason: Prior to IV Start


Sodium Chloride (Saline Flush)  10 ml FLUSH ASDIRECTED PRN


   PRN Reason: Keep Vein Open





Discontinued Medications





Fentanyl (Sublimaze) Confirm Administered Dose 100 mcg .ROUTE .STK-MED ONE


   Stop: 12/11/17 07:15


Propofol (Diprivan  20 Ml) Confirm Administered Dose 200 mg .ROUTE .STK-MED ONE


   Stop: 12/11/17 07:15

## 2020-10-28 ENCOUNTER — HOSPITAL ENCOUNTER (OUTPATIENT)
Dept: HOSPITAL 41 - JD.ED | Age: 80
Setting detail: OBSERVATION
LOS: 2 days | Discharge: HOME | End: 2020-10-30
Attending: FAMILY MEDICINE | Admitting: EMERGENCY MEDICINE
Payer: MEDICARE

## 2020-10-28 DIAGNOSIS — C61: ICD-10-CM

## 2020-10-28 DIAGNOSIS — Z79.899: ICD-10-CM

## 2020-10-28 DIAGNOSIS — R19.7: ICD-10-CM

## 2020-10-28 DIAGNOSIS — Z87.891: ICD-10-CM

## 2020-10-28 DIAGNOSIS — T45.1X5A: ICD-10-CM

## 2020-10-28 DIAGNOSIS — C79.52: ICD-10-CM

## 2020-10-28 DIAGNOSIS — R55: ICD-10-CM

## 2020-10-28 DIAGNOSIS — K21.9: ICD-10-CM

## 2020-10-28 DIAGNOSIS — Z88.8: ICD-10-CM

## 2020-10-28 DIAGNOSIS — R11.2: Primary | ICD-10-CM

## 2020-10-28 DIAGNOSIS — Z20.828: ICD-10-CM

## 2020-10-28 PROCEDURE — 74177 CT ABD & PELVIS W/CONTRAST: CPT

## 2020-10-28 PROCEDURE — 96374 THER/PROPH/DIAG INJ IV PUSH: CPT

## 2020-10-28 PROCEDURE — 96375 TX/PRO/DX INJ NEW DRUG ADDON: CPT

## 2020-10-28 PROCEDURE — 84484 ASSAY OF TROPONIN QUANT: CPT

## 2020-10-28 PROCEDURE — 87040 BLOOD CULTURE FOR BACTERIA: CPT

## 2020-10-28 PROCEDURE — 36415 COLL VENOUS BLD VENIPUNCTURE: CPT

## 2020-10-28 PROCEDURE — 85652 RBC SED RATE AUTOMATED: CPT

## 2020-10-28 PROCEDURE — 85730 THROMBOPLASTIN TIME PARTIAL: CPT

## 2020-10-28 PROCEDURE — 96376 TX/PRO/DX INJ SAME DRUG ADON: CPT

## 2020-10-28 PROCEDURE — U0002 COVID-19 LAB TEST NON-CDC: HCPCS

## 2020-10-28 PROCEDURE — 83735 ASSAY OF MAGNESIUM: CPT

## 2020-10-28 PROCEDURE — 97116 GAIT TRAINING THERAPY: CPT

## 2020-10-28 PROCEDURE — 71045 X-RAY EXAM CHEST 1 VIEW: CPT

## 2020-10-28 PROCEDURE — 85610 PROTHROMBIN TIME: CPT

## 2020-10-28 PROCEDURE — 80053 COMPREHEN METABOLIC PANEL: CPT

## 2020-10-28 PROCEDURE — 72125 CT NECK SPINE W/O DYE: CPT

## 2020-10-28 PROCEDURE — 70450 CT HEAD/BRAIN W/O DYE: CPT

## 2020-10-28 PROCEDURE — 71275 CT ANGIOGRAPHY CHEST: CPT

## 2020-10-28 PROCEDURE — 97535 SELF CARE MNGMENT TRAINING: CPT

## 2020-10-28 PROCEDURE — 81001 URINALYSIS AUTO W/SCOPE: CPT

## 2020-10-28 PROCEDURE — 84100 ASSAY OF PHOSPHORUS: CPT

## 2020-10-28 PROCEDURE — 83605 ASSAY OF LACTIC ACID: CPT

## 2020-10-28 PROCEDURE — 93005 ELECTROCARDIOGRAM TRACING: CPT

## 2020-10-28 PROCEDURE — 99285 EMERGENCY DEPT VISIT HI MDM: CPT

## 2020-10-28 PROCEDURE — 86140 C-REACTIVE PROTEIN: CPT

## 2020-10-28 PROCEDURE — 97162 PT EVAL MOD COMPLEX 30 MIN: CPT

## 2020-10-28 PROCEDURE — 85025 COMPLETE CBC W/AUTO DIFF WBC: CPT

## 2020-10-28 PROCEDURE — 97165 OT EVAL LOW COMPLEX 30 MIN: CPT

## 2020-10-28 PROCEDURE — G0378 HOSPITAL OBSERVATION PER HR: HCPCS

## 2020-10-28 PROCEDURE — 83880 ASSAY OF NATRIURETIC PEPTIDE: CPT

## 2020-10-28 PROCEDURE — 83690 ASSAY OF LIPASE: CPT

## 2020-10-28 PROCEDURE — 71250 CT THORAX DX C-: CPT

## 2020-10-28 NOTE — EDM.PDOC
ED HPI GENERAL MEDICAL PROBLEM





- General


Chief Complaint: Gastrointestinal Problem


Stated Complaint: [facility]


Time Seen by Provider: 10/28/20 06:57


Source of Information: Reports: Patient


History Limitations: Reports: No Limitations





- History of Present Illness


INITIAL COMMENTS - FREE TEXT/NARRATIVE: 


80-year-old male presents to the ED per [place] [facility].  Patient reports he

has been receiving chemotherapy for stage IV prostate cancer which has spread to

his bones for the last 2-1/2 months.  Initial diagnosis was about 6 years ago 

and he was treated with cesium seed implants . PSA started to go up over the 

last 18 months.He recieved further radiation to his pelvis initially .  Patient 

has  received 7 course out of 12 proposed courses of chemotherapy on Friday October 23.  He states usually it makes him have significant nausea difficulty 

eating and some diarrhea.  Vomiting started last evening about 2000 hrs. and he 

vomited recurrently all night long.  Diarrhea started about midnight and he 

proposes that he has had at least 6 large volume stool losses.  He did not 

notice blood in emesis or diarrhea per se.  This morning he was lightheaded 

dizzy on his way into the bathroom and fell to the floor injuring his left ribs 

he believes on the tile floor.  He does not believe he lost consciousness 

completely.  He could not get up because of weakness and crawled out to the 

hallway where his wife met him.  She then called the ambulance.  Patient remains

weak and nauseated.  No definitive fever or chills.  Appetite has been poor for 

the last 2 and half days.  Of note the patient is on Xarelto 15mg once daily.





Onset: Sudden


Onset Date: 10/27/20


Onset Time: 20:00


Duration: Hour(s):, Getting Worse


Location: Reports: Abdomen (Lysed weakness with syncopal event at home this 

morning.  Intractable nausea and vomiting and large volume diarrhea stool 

losses.), Generalized


Quality: Reports: Other (Generalized severe weakness.)


Severity: Severe (Weakness)


Improves with: Reports: None


Worsens with: Reports: Other (Trying to get up and)


Context: Reports: Other (Giving chemotherapy for bone metastatic prostate 

cancer.  Nausea vomiting diarrhea felt to be secondary to recent chemotherapy.).

 Denies: Activity ( walk.), Exercise, Lifting, Sick Contact, Trauma


Associated Symptoms: Reports: Chest Pain, Cough (Mild), Loss of Appetite, 

Malaise, Nausea/Vomiting, Shortness of Breath, Syncope (Clement in his bathroom 

this morning probably about 530 hours.  Injury to his left lateral ribs), 

Weakness (Generalized), Other (Persistent diarrhea large volume fluid losses).  

Denies: Confusion, cough w sputum, Diaphoresis, Fever/Chills ( intermittent 

cough nonproductive), Headaches, Rash, Seizure (With nausea and vomiting since 

2000 hrs. last night no hemoptysis.)


Treatments PTA: Reports: Other (see below) (None.)


  ** Left Chest


Pain Score (Numeric/FACES): 8





- Related Data


                                    Allergies











Allergy/AdvReac Type Severity Reaction Status Date / Time


 


metoprolol AdvReac  Anxiety Verified 10/28/20 06:57


 


rosuvastatin [From Crestor] AdvReac  Anxiety Verified 10/28/20 06:57


 


Statins-Hmg-Coa Reductase AdvReac  Leg Cramps Verified 10/28/20 06:57





Inhibitor     











Home Meds: 


                                    Home Meds





traMADol [Ultram] 50 mg PO TID PRN 07/20/17 [History]


Pramipexole [Mirapex] 0.75 mg PO BEDTIME 12/08/17 [History]


Apixaban [Eliquis] 5 mg PO BID 10/28/20 [History]


Famotidine 20 mg PO BEDTIME PRN 10/28/20 [History]


Losartan [Cozaar] 100 mg PO DAILY 10/28/20 [History]


Magnesium Oxide 400 mg PO DAILY 10/28/20 [History]


Nitroglycerin 0.6 mg SL ASDIRECTED PRN 10/28/20 [History]


Ondansetron [Zofran Odt] 8 mg SL Q8H PRN 10/28/20 [History]


Potassium Chloride [K-Tab ER] 10 meq PO ASDIRECTED 10/28/20 [History]


Pravastatin [Pravachol] 40 mg PO DAILY 10/28/20 [History]


Prochlorperazine [Compazine] 10 mg PO QID PRN 10/28/20 [History]


amLODIPine [Norvasc] 5 mg PO DAILY 10/28/20 [History]


hydroCHLOROthiazide [Hydrochlorothiazide] 25 mg PO DAILY 10/28/20 [History]


predniSONE 5 mg PO BID 10/28/20 [History]


traMADol [Ultram] 25 - 50 mg PO BEDTIME PRN 10/28/20 [History]











Past Medical History





- Past Health History


Medical/Surgical History: Denies Medical/Surgical History


HEENT History: Reports: None


Cardiovascular History: Reports: Angina, Blood Clots/VTE/DVT, CAD, High 

Cholesterol, Hypertension


Other Cardiovascular History: peripheral edema


Respiratory History: Reports: PE, Other (See Below)


Other Respiratory History: lung nodules


Gastrointestinal History: Reports: Bowel Obstruction, Colon Polyp, 

Diverticulosis, Gastritis, GERD, Helicobacter Pylori, Hiatal Hernia


Other Gastrointestinal History: esophagitis


Genitourinary History: Reports: Prostate Disorder


OB/GYN History: Reports: None


Musculoskeletal History: Reports: Osteoarthritis


Other Musculoskeletal History: restless leg syndrome


Neurological History: Reports: TIA


Psychiatric History: Reports: None


Endocrine/Metabolic History: Reports: None


Hematologic History: Reports: Anemia


Immunologic History: Reports: None


Oncologic (Cancer) History: Reports: Metastatic, Prostate


Other Oncologic History: prostate ca with mets to bone, bone marrow biopsy.  

Initial  diagnosis of prostate cancer was approximately 2014 and initial 

treatment was cesium seed implants.


Dermatologic History: Reports: Melanoma


Other Dermatologic History: removal of melanoma





- Infectious Disease History


Infectious Disease History: Reports: Shingles





- Past Surgical History


GI Surgical History: Reports: Colonoscopy, EGD


Other Musculoskeletal Surgeries/Procedures:: osteoarthritis of left shoulder





Social & Family History





- Family History


HEENT: Reports: Hearing Impairment


Other HEENT Family History: Mother


Cardiac: Reports: Heart Failure, Pacemaker


Other Cardiac Family History: brother-pacemaker.  mother- HF


Respiratory: Reports: None


GI: Reports: None


Musculoskeletal: Reports: Arthritis


Neurological: Reports: Alzheimers Disease


Other Neurological Family History: Dad-alzheimer's


Psychiatric: Reports: None





- Caffeine Use


Caffeine Use: Reports: Coffee


Other Caffeine Use: Drinks 3-4 cups every morning





- Living Situation & Occupation


Living situation: Reports: 


Occupation: Retired





ED [facility] GENERAL





- Review of Systems


Review Of Systems: See Below


Constitutional: Reports: Malaise, Weakness, Fatigue, Decreased Appetite, Weight 

Loss.  Denies: Fever, Chills


HEENT: Reports: Glasses


Respiratory: Reports: Shortness of Breath, Cough (Minimal production of 

sputum.).  Denies: Wheezing, Pleuritic Chest Pain


Cardiovascular: Reports: Chest Pain (Left sided chest wall pain after falling 

from syncopal event this morning onto the bathroom tiled floor.), Blood Pressure

 Problem, Dyspnea on Exertion, Lightheadedness, Syncope (Syncopal event this 

morning likely due to orthostatic hypotension.).  Denies: Claudication, 

Orthopnea


Endocrine: Reports: Fatigue


GI/Abdominal: Reports: Abdominal Pain (Generalized abdominal discomfort), Diarr

hea (Prolific diarrhea 6-7 times overnight large volume fluid losses without any

 blood appreciated.), Decreased Appetite, Difficulty Swallowing (Informed to 

swallow over the last 2 days.  States this usually occurs with his 

chemotherapy.), Nausea, Vomiting.  Denies: Hematemesis, Hematochezia, Melena 

(With no hemoptysis.)


: Reports: Other (Decreased urine output overnight.)


Musculoskeletal: Reports: Neck Pain (Left posterior neck pain), Shoulder Pain, 

Back Pain, Other (Left chest wall pain)


Skin: Reports: Bruising


Neurological: Reports: Dizziness, Syncope (X1 this morning is approximate 0530 

hrs. when he fell in the bathroom onto the hard tile floor.  He does not believe

 he struck any of the), Difficulty Walking, Weakness.  Denies: Confusion, 

Headache, Numbness, Paresthesia, Pre-Existing Deficit, Seizure (Due to 

weakness.), Tingling, Tremors


Psychiatric: Reports: No Symptoms


Hematologic/Lymphatic: Reports: Anemia, Easy Bleeding, Easy Bruising


Immunologic: Reports: No Symptoms





ED EXAM, GI/ABD





- Physical Exam


Exam: See Below


Exam Limited By: No Limitations


General Appearance: Alert, Moderate Distress, Other (Temperature is 36.8 with a 

heart rate of 72 respiratory 16 with O2 sats 100% room air BP 90/44)


Eyes: Bilateral: Normal Appearance (No scleral icterus mild lateral pallor.)


Throat/Mouth: Other (Tongue is dry and coated.)


Head: Atraumatic (  No aphthous ulcers appreciated), Normocephalic


Neck: Normal Inspection, Tender Lateral (Tender laterally at the base of the 

cervical spine adjacent to the left C7 vertebra.).  No: Lymphadenopathy (L), 

Lymphadenopathy (R)


Respiratory/Chest: No Respiratory Distress, Lungs Clear, Normal Breath Sounds, 

Other (Tenderness to palpation of left posterior lateral ribs without palpable 

subcutaneous emphysema or crepitus.)


Cardiovascular: Regular Rate, Rhythm, No Edema, No Gallop, No Murmur, Other 

(Patient has a Port-A-Cath left upper anterior chest.)


GI/Abdominal Exam: No Organomegaly, No Mass, Tender (It is in the epigastrium on

 exam.), Abnormal Bowel Sounds (Bowel sounds are diminished throughout all lung 

fields at this time).  No: Distended, Guarding, Rigid, Rebound


Back Exam: Decreased Range of Motion, Other (Contusion posterior lateral mid 

thorax.)


Extremities: Other (Minimal contusions to the anterior aspect of both knees left

 worse than the right.  He is able to lift both legs off the gurney but has 

limited internal and external rotation of both hips due to arthritis.  

Clinically no evidence of fracture pelvis.)


Neurological: Alert, Oriented, CN II-XII Intact, Normal Cognition


Psychiatric: Normal Affect, Normal Mood


Skin Exam: Warm, Dry, Intact, Normal Color, Other (Contusions to both anterior 

knees and left posterior thorax.).  No: No Rash


  ** #1 Interpretation


EKG Date: 10/28/20


Time: 06:56


Rhythm: NSR


Rate (Beats/Min): 72


Axis: Normal


P-Wave: Present


QRS: Other (Early R wave transition consider right ventricular hypertrophy 

versus septal hypertrophy pattern.)


ST-T: Other (T wave inversion in aVL nonspecific finding)


QT: Prolonged (Moderately prolonged)


EKG Interpretation Comments: 





Abnormal ECG





Course





- Vital Signs


Last Recorded V/S: 


                                Last Vital Signs











Temp  36.8 C   10/28/20 06:57


 


Pulse  72   10/28/20 06:57


 


Resp  16   10/28/20 06:57


 


BP  90/44 L  10/28/20 06:57


 


Pulse Ox  100   10/28/20 06:57














- Orders/Labs/Meds


Orders: 


                               Active Orders 24 hr











 Category Date Time Status


 


 Blood Glucose Check, Bedside [RC] ONETIME Care  10/28/20 07:15 Active


 


 EKG 12 Lead [EKG Documentation Completion] [RC] STAT Care  10/28/20 06:55 

Active


 


 Insert Lr Catheter [Insert Urinary Catheter] [OM.PC] Care  10/28/20 08:15 

Ordered





 Stat   


 


 Urinary Catheter Assessment [RC] ASDIRECTED Care  10/28/20 08:15 Active


 


 Cervical Spine wo Cont [CT] Stat Exams  10/28/20 07:36 Taken


 


 Chest wo Cont [CT] Stat Exams  10/28/20 07:39 Taken


 


 Head wo Cont [CT] Stat Exams  10/28/20 07:35 Taken


 


 CULTURE BLOOD [BC] Stat Lab  10/28/20 07:49 Received


 


 CULTURE BLOOD [BC] Stat Lab  10/28/20 08:04 Received


 


 LACTIC ACID [CHEM] Stat Lab  10/28/20 10:13 Ordered


 


 Dextrose 5%-0.9% NaCl [Dextrose 5%-Normal Saline] 1,000 Med  10/28/20 07:30 

Active





 ml   





 IV ASDIRECTED   


 


 Lactated Ringers [Ringers, Lactated] 1,000 ml Med  10/28/20 10:15 Active





 IV ASDIRECTED   


 


 Blood Culture x2 Reflex Set [OM.PC] Stat Oth  10/28/20 07:16 Ordered








                                Medication Orders





Dextrose/Sodium Chloride (Dextrose 5%-Normal Saline)  1,000 mls @ 999 mls/hr IV 

ASDIRECTED JONY


   Last Admin: 10/28/20 07:37  Dose: 999 mls/hr


   Documented by: NAYELI


Lactated Ringer's (Ringers, Lactated)  1,000 mls @ 75 mls/hr IV ASDIRECTED [facility]








Labs: 


                                Laboratory Tests











  10/28/20 10/28/20 10/28/20 Range/Units





  07:40 07:49 07:49 


 


WBC   7.01   (4.23-9.07)  K/mm3


 


RBC   4.02 L   (4.63-6.08)  M/mm3


 


Hgb   10.5 L D   (13.7-17.5)  gm/dl


 


Hct   32.2 L   (40.1-51.0)  %


 


MCV   80.1  D   (79.0-92.2)  fl


 


MCH   26.1   (25.7-32.2)  pg


 


MCHC   32.6   (32.2-35.5)  g/dl


 


RDW Std Deviation   46.9 H   (35.1-43.9)  fL


 


Plt Count   197   (163-337)  K/mm3


 


MPV   10.2   (9.4-12.3)  fl


 


Neut % (Auto)   77.2 H   (34.0-67.9)  %


 


Lymph % (Auto)   18.5 L   (21.8-53.1)  %


 


Mono % (Auto)   1.9 L   (5.3-12.2)  %


 


Eos % (Auto)   0 L   (0.8-7.0)  


 


Baso % (Auto)   1.3 H   (0.1-1.2)  %


 


Neut # (Auto)   5.41 H   (1.78-5.38)  K/mm3


 


Lymph # (Auto)   1.30 L   (1.32-3.57)  K/mm3


 


Mono # (Auto)   0.13 L   (0.30-0.82)  K/mm3


 


Eos # (Auto)   0.00 L   (0.04-0.54)  K/mm3


 


Baso # (Auto)   0.09 H   (0.01-0.08)  K/mm3


 


ESR     (0-15)  mm/hr


 


PT    11.4  (9.7-12.0)  SECONDS


 


INR    1.07  


 


APTT    28.5  (21.7-31.4)  SECONDS


 


Sodium     (136-145)  mEq/L


 


Potassium     (3.5-5.1)  mEq/L


 


Chloride     ()  mEq/L


 


Carbon Dioxide     (21-32)  mEq/L


 


Anion Gap     (5-15)  


 


BUN     (7-18)  mg/dL


 


Creatinine     (0.7-1.3)  mg/dL


 


Est Cr Clr Drug Dosing     mL/min


 


Estimated GFR (MDRD)     (>60)  mL/min


 


BUN/Creatinine Ratio     (14-18)  


 


Glucose     ()  mg/dL


 


Lactic Acid     (0.4-2.0)  mmol/L


 


Calcium     (8.5-10.1)  mg/dL


 


Magnesium     (1.8-2.4)  mg/dl


 


Total Bilirubin     (0.2-1.0)  mg/dL


 


AST     (15-37)  U/L


 


ALT     (16-63)  U/L


 


Alkaline Phosphatase     ()  U/L


 


Troponin I     (0.00-0.056)  ng/mL


 


C-Reactive Protein     (<1.0)  mg/dL


 


NT-Pro-B Natriuret Pep     (0-450)  pg/mL


 


Total Protein     (6.4-8.2)  g/dl


 


Albumin     (3.4-5.0)  g/dl


 


Globulin     gm/dL


 


Albumin/Globulin Ratio     (1-2)  


 


Lipase     ()  U/L


 


Urine Color     (Yellow)  


 


Urine Appearance     (Clear)  


 


Urine pH     (5.0-8.0)  


 


Ur Specific Gravity     (1.005-1.030)  


 


Urine Protein     (Negative)  


 


Urine Glucose (UA)     (Negative)  


 


Urine Ketones     (Negative)  


 


Urine Occult Blood     (Negative)  


 


Urine Nitrite     (Negative)  


 


Urine Bilirubin     (Negative)  


 


Urine Urobilinogen     (0.2-1.0)  


 


Ur Leukocyte Esterase     (Negative)  


 


U Hyaline Cast (Auto)     (0-5)  /lpf


 


Urine RBC     (0-5)  /hpf


 


Urine WBC     (0-5)  /hpf


 


Ur Epithelial Cells     (0-5)  /hpf


 


Urine Bacteria     (FEW)  /hpf


 


Urine Mucus     (FEW)  /hpf


 


SARS-CoV-2 RNA (SAKSHI)  Negative    (NEGATIVE)  














  10/28/20 10/28/20 10/28/20 Range/Units





  07:49 07:49 07:49 


 


WBC     (4.23-9.07)  K/mm3


 


RBC     (4.63-6.08)  M/mm3


 


Hgb     (13.7-17.5)  gm/dl


 


Hct     (40.1-51.0)  %


 


MCV     (79.0-92.2)  fl


 


MCH     (25.7-32.2)  pg


 


MCHC     (32.2-35.5)  g/dl


 


RDW Std Deviation     (35.1-43.9)  fL


 


Plt Count     (163-337)  K/mm3


 


MPV     (9.4-12.3)  fl


 


Neut % (Auto)     (34.0-67.9)  %


 


Lymph % (Auto)     (21.8-53.1)  %


 


Mono % (Auto)     (5.3-12.2)  %


 


Eos % (Auto)     (0.8-7.0)  


 


Baso % (Auto)     (0.1-1.2)  %


 


Neut # (Auto)     (1.78-5.38)  K/mm3


 


Lymph # (Auto)     (1.32-3.57)  K/mm3


 


Mono # (Auto)     (0.30-0.82)  K/mm3


 


Eos # (Auto)     (0.04-0.54)  K/mm3


 


Baso # (Auto)     (0.01-0.08)  K/mm3


 


ESR   13   (0-15)  mm/hr


 


PT     (9.7-12.0)  SECONDS


 


INR     


 


APTT     (21.7-31.4)  SECONDS


 


Sodium  133 L    (136-145)  mEq/L


 


Potassium  3.7    (3.5-5.1)  mEq/L


 


Chloride  98    ()  mEq/L


 


Carbon Dioxide  24    (21-32)  mEq/L


 


Anion Gap  14.7    (5-15)  


 


BUN  21 H    (7-18)  mg/dL


 


Creatinine  1.1    (0.7-1.3)  mg/dL


 


Est Cr Clr Drug Dosing  55.14    mL/min


 


Estimated GFR (MDRD)  > 60    (>60)  mL/min


 


BUN/Creatinine Ratio  19.1 H    (14-18)  


 


Glucose  201 H    ()  mg/dL


 


Lactic Acid     (0.4-2.0)  mmol/L


 


Calcium  7.5 L D    (8.5-10.1)  mg/dL


 


Magnesium  2.7 H    (1.8-2.4)  mg/dl


 


Total Bilirubin  0.9    (0.2-1.0)  mg/dL


 


AST  19    (15-37)  U/L


 


ALT  22    (16-63)  U/L


 


Alkaline Phosphatase  106    ()  U/L


 


Troponin I  < 0.017    (0.00-0.056)  ng/mL


 


C-Reactive Protein  3.4 H*    (<1.0)  mg/dL


 


NT-Pro-B Natriuret Pep    229  (0-450)  pg/mL


 


Total Protein  5.9 L    (6.4-8.2)  g/dl


 


Albumin  3.0 L    (3.4-5.0)  g/dl


 


Globulin  2.9    gm/dL


 


Albumin/Globulin Ratio  1.0    (1-2)  


 


Lipase     ()  U/L


 


Urine Color     (Yellow)  


 


Urine Appearance     (Clear)  


 


Urine pH     (5.0-8.0)  


 


Ur Specific Gravity     (1.005-1.030)  


 


Urine Protein     (Negative)  


 


Urine Glucose (UA)     (Negative)  


 


Urine Ketones     (Negative)  


 


Urine Occult Blood     (Negative)  


 


Urine Nitrite     (Negative)  


 


Urine Bilirubin     (Negative)  


 


Urine Urobilinogen     (0.2-1.0)  


 


Ur Leukocyte Esterase     (Negative)  


 


U Hyaline Cast (Auto)     (0-5)  /lpf


 


Urine RBC     (0-5)  /hpf


 


Urine WBC     (0-5)  /hpf


 


Ur Epithelial Cells     (0-5)  /hpf


 


Urine Bacteria     (FEW)  /hpf


 


Urine Mucus     (FEW)  /hpf


 


SARS-CoV-2 RNA (SAKSHI)     (NEGATIVE)  














  10/28/20 10/28/20 10/28/20 Range/Units





  07:49 07:49 08:15 


 


WBC     (4.23-9.07)  K/mm3


 


RBC     (4.63-6.08)  M/mm3


 


Hgb     (13.7-17.5)  gm/dl


 


Hct     (40.1-51.0)  %


 


MCV     (79.0-92.2)  fl


 


MCH     (25.7-32.2)  pg


 


MCHC     (32.2-35.5)  g/dl


 


RDW Std Deviation     (35.1-43.9)  fL


 


Plt Count     (163-337)  K/mm3


 


MPV     (9.4-12.3)  fl


 


Neut % (Auto)     (34.0-67.9)  %


 


Lymph % (Auto)     (21.8-53.1)  %


 


Mono % (Auto)     (5.3-12.2)  %


 


Eos % (Auto)     (0.8-7.0)  


 


Baso % (Auto)     (0.1-1.2)  %


 


Neut # (Auto)     (1.78-5.38)  K/mm3


 


Lymph # (Auto)     (1.32-3.57)  K/mm3


 


Mono # (Auto)     (0.30-0.82)  K/mm3


 


Eos # (Auto)     (0.04-0.54)  K/mm3


 


Baso # (Auto)     (0.01-0.08)  K/mm3


 


ESR     (0-15)  mm/hr


 


PT     (9.7-12.0)  SECONDS


 


INR     


 


APTT     (21.7-31.4)  SECONDS


 


Sodium     (136-145)  mEq/L


 


Potassium     (3.5-5.1)  mEq/L


 


Chloride     ()  mEq/L


 


Carbon Dioxide     (21-32)  mEq/L


 


Anion Gap     (5-15)  


 


BUN     (7-18)  mg/dL


 


Creatinine     (0.7-1.3)  mg/dL


 


Est Cr Clr Drug Dosing     mL/min


 


Estimated GFR (MDRD)     (>60)  mL/min


 


BUN/Creatinine Ratio     (14-18)  


 


Glucose     ()  mg/dL


 


Lactic Acid  2.2 H*    (0.4-2.0)  mmol/L


 


Calcium     (8.5-10.1)  mg/dL


 


Magnesium     (1.8-2.4)  mg/dl


 


Total Bilirubin     (0.2-1.0)  mg/dL


 


AST     (15-37)  U/L


 


ALT     (16-63)  U/L


 


Alkaline Phosphatase     ()  U/L


 


Troponin I     (0.00-0.056)  ng/mL


 


C-Reactive Protein     (<1.0)  mg/dL


 


NT-Pro-B Natriuret Pep     (0-450)  pg/mL


 


Total Protein     (6.4-8.2)  g/dl


 


Albumin     (3.4-5.0)  g/dl


 


Globulin     gm/dL


 


Albumin/Globulin Ratio     (1-2)  


 


Lipase   28 L   ()  U/L


 


Urine Color    Dark yellow  (Yellow)  


 


Urine Appearance    Slt cloudy H  (Clear)  


 


Urine pH    7.5  (5.0-8.0)  


 


Ur Specific Gravity    1.025  (1.005-1.030)  


 


Urine Protein    2+ H  (Negative)  


 


Urine Glucose (UA)    Negative  (Negative)  


 


Urine Ketones    Trace H  (Negative)  


 


Urine Occult Blood    Negative  (Negative)  


 


Urine Nitrite    Negative  (Negative)  


 


Urine Bilirubin    Negative  (Negative)  


 


Urine Urobilinogen    1.0  (0.2-1.0)  


 


Ur Leukocyte Esterase    Negative  (Negative)  


 


U Hyaline Cast (Auto)    0-5  (0-5)  /lpf


 


Urine RBC    0-5  (0-5)  /hpf


 


Urine WBC    0-5  (0-5)  /hpf


 


Ur Epithelial Cells    Not seen  (0-5)  /hpf


 


Urine Bacteria    Few  (FEW)  /hpf


 


Urine Mucus    Few  (FEW)  /hpf


 


SARS-CoV-2 RNA (SAKSHI)     (NEGATIVE)  











Meds: 


Medications











Generic Name Dose Route Start Last Admin





  Trade Name Oral  PRN Reason Stop Dose Admin


 


Dextrose/Sodium Chloride  1,000 mls @ 999 mls/hr  10/28/20 07:30  10/28/20 07:37





  Dextrose 5%-Normal Saline  IV   999 mls/hr





  ASDIRECTED JONY   Administration


 


Lactated Ringer's  1,000 mls @ 75 mls/hr  10/28/20 10:15 





  Ringers, Lactated  IV  





  ASDIRECTED JONY  














Discontinued Medications














Generic Name Dose Route Start Last Admin





  Trade Name Oral  PRN Reason Stop Dose Admin


 


Hydromorphone HCl  0.5 mg  10/28/20 07:19  10/28/20 07:33





  Dilaudid  IVPUSH  10/28/20 07:20  0.5 mg





  ONETIME ONE   Administration


 


Hydromorphone HCl  0.5 mg  10/28/20 09:19  10/28/20 09:25





  Dilaudid  IVPUSH  10/28/20 09:20  0.5 mg





  ONETIME ONE   Administration


 


Metoclopramide HCl  7.5 mg  10/28/20 07:18  10/28/20 07:30





  Reglan  IVPUSH  10/28/20 07:19  7.5 mg





  ONETIME ONE   Administration


 


Pramipexole Dihydrochloride  0.5 mg  10/28/20 09:18  10/28/20 09:30





  Mirapex  PO  10/28/20 09:19  0.5 mg





  ONETIME ONE   Administration














- Radiology Interpretation


Free Text/Narrative:: 


80-year-old male presents to the ED predicts an ambulance from his home here in 

[place].  Patient is currently receiving chemotherapy and finished his seventh

 out of pupils 12 course on Friday, October 23.  He states the chemotherapy is 

for prostate cancer stage IV which is spread to multiple bones.  Initial 

prostate cancer treated many years ago with cesium seed implants.  Patient has 

always developed some nausea and some diarrhea and usually vomiting with 

chemotherapy.  However this is the worst that he is ever felt.  He states 

diarrhea started around midnight with 6 or 7 prolific large-volume stool losses 

without noted blood.  Vomiting started about 2000 hrs. last night and is 

continue at least every 30 to 45 minutes without hematemesis.  This morning at 

approximately  530 hours while he was in the bathroom to vomit once again he 

became very lightheaded dizzy and fell to the floor landing on hard tiled 

surface.  Injury to the left posterior lateral ribs and some pain noted at the 

base of his left neck.  Patient is on Xarelto 15 mg once daily as well.  Blood 

pressure is low with initial systolic pressure of 86.  IV will be started D5 

normal saline at open.  He will be given Reglan 7.5 mg IV for nausea relief and 

Dilaudid 0.5 mg IV for pain relief.  Routine labs to be collected including the 

coronavirus screen as he is likely going to require admission to the hospital.  

He will have CT of his cervical spine had and chest without contrast .








- Re-Assessments/Exams


Free Text/Narrative Re-Assessment/Exam: 





10/28/20 08:36 White blood cell count is 7.01 with 77.2% neutrophils.  

Hemoglobin is 10.5 with hematocrit of 32.2.  Platelet count 197,000.  Urinalysis

 is dark yellow slightly cloudy with 2+ proteinuria and trace of ketones no 

evidence of infection.  Patient  is currently in the CT suite.


10/28/20 08:55 Blood pressure has improved to 110/47.  O2 sats 98%.  Heart rate 

down to 81/min.





10/28/20 08:57 Sodium is 133 with a potassium of 3.7.  Chloride is 98 with a 

bicarb of 24.  Anion gap is 14.7.  BUN is slightly elevated at 21 with a 

creatinine of 1.1 and a GFR greater than 60.  BUN/creatinine ratio slightly 

elevated at 19.1.  Glucose elevated at 201.  Lactic acid elevated at 2.2.  

Calcium 7.5 with a magnesium of 2.7.  Bilirubin and liver function are normal.  

Troponin I less than 0.017 C-reactive protein is elevated at 3.4.  BNP is 229 

total protein is low at 5.9 with an albumin fraction low at 3.0.  Lipase is 

normal at 28.  COVID-19 screen is negative.  Blood pressure is 110/47.  Heart 

rate 81 and sinus O2 sats 99% room air.





10/28/20 09:19 Patient is having increased bilateral leg pain due to restless 

leg syndrome.  Diffuse  chronic low back pain with bilateral sciatica is a 

chronic problem for him.  This is likely been aggravated by the use of Reglan 

7.5 mg IV for nausea and vomiting relief.  Reglan was utilized because of a 

prolonged QT interval which could be made worse by  Zofran.  I am going to 

therefore give the Mirapex 0.5 mg p.o. now and an additional dose of Dilaudid 

0.5 mg IV for pain relief.  Blood pressure is currently up to 132/55.  CT of 

cervical spine reveals loss of normal curvature.  Diffuse degenerative changes 

from C-3 to C -7 identified.  Bone is demineralized in this area .  CT of the 

chest done without contrast reveals no obvious rib fractures or pulmonary 

contusion.  There is no pneumothorax.  Mild changes of emphysema with scarring 

in the lung apices right worse than the left.  Calcified granuloma right middle 

lobe of lung.  Mild cardiomegaly.  Indwelling left central venous catheter with 

tip in the superior vena cava.  There is evidence of diffuse sclerotic skeletal 

metastases which have progressed since 07/19/2017.  No fractures are identified 

degenerative arthritis throughout the thoracic spine diffuse atherosclerosis of 

the thoracic and abdominal aorta without aneurysmal change.  Visualized portions

 of the upper abdomen reveal liver to have fatty infiltration.  Gallbladder does

 not reveal any calcified gallstones.  Stomach appears normal.  Spleen is 

slightly enlarged.  Kidneys are atrophic bilaterally without any obvious stones.

  No signs of urinary tract obstruction.  CT scans of the brain reveal no skull 

fractures.  No intracranial bleeding or mass-effect.  Diffuse small vessel 

ischemic change appreciated both basal ganglia.  No evidence of infarct 

identified.  There is some motion artifact on this CT.





10/28/20 10:00 [facility]  has over read the CT scans of the head neck and chest and 

they agree with no evidence of fractures.  Will discuss case with on-call 

hospitalist Dr. Marte with a view to having him admitted for observation status

 to replenish his fluids and make sure that he is able to eat and drink without 

aggravation of his diarrhea.





10/28/20 10:10 I have spoken with Dr Edge and he has accepted care. He will 

be admitted to the med surgery floor per observation status.  Scheduled for 

repeat lactic acid draw at 11 AM.














Departure





- Departure


Time of Disposition: 10:31


Disposition: Refer to Observation


Condition: Fair


Clinical Impression: 


 Intractable nausea and vomiting, Severe diarrhea, Adenocarcinoma of prostate, 

stage 4, Lactic acidosis





Adverse effect of chemotherapy


Qualifiers:


 Encounter type: initial encounter Qualified Code(s): T45.1X5A - Adverse effect 

of antineoplastic and immunosuppressive drugs, initial encounter








- Discharge Information


*PRESCRIPTION DRUG MONITORING PROGRAM REVIEWED*: Not Applicable


*COPY OF PRESCRIPTION DRUG MONITORING REPORT IN PATIENT [facility]: Not Applicable


Referrals: 


PCP,None [Ordering Only Provider] - 


Forms:  ED Department Discharge





Sepsis Event Note (ED)





- Focused Exam


Vital Signs: 


                                   Vital Signs











  Temp Pulse Resp BP Pulse Ox


 


 10/28/20 06:57  36.8 C  72  16  90/44 L  100














- My Orders


Last 24 Hours: 


My Active Orders





10/28/20 06:55


EKG 12 Lead [EKG Documentation Completion] [RC] STAT 





10/28/20 07:15


Blood Glucose Check, Bedside [RC] ONETIME 





10/28/20 07:16


Blood Culture x2 Reflex Set [OM.PC] Stat 





10/28/20 07:30


Dextrose 5%-0.9% NaCl [Dextrose 5%-Normal Saline] 1,000 ml IV ASDIRECTED 





10/28/20 07:35


Head wo Cont [CT] Stat 





10/28/20 07:36


Cervical Spine wo Cont [CT] Stat 





10/28/20 07:39


Chest wo Cont [CT] Stat 





10/28/20 07:49


CULTURE BLOOD [BC] Stat 





10/28/20 08:04


CULTURE BLOOD [BC] Stat 





10/28/20 08:15


Insert Lr Catheter [Insert Urinary Catheter] [OM.PC] Stat 


Urinary Catheter Assessment [RC] ASDIRECTED 





10/28/20 10:13


LACTIC ACID [CHEM] Stat 





10/28/20 10:15


Lactated Ringers [Ringers, Lactated] 1,000 ml IV ASDIRECTED 














- Assessment/Plan


Last 24 Hours: 


My Active Orders





10/28/20 06:55


EKG 12 Lead [EKG Documentation Completion] [RC] STAT 





10/28/20 07:15


Blood Glucose Check, Bedside [RC] ONETIME 





10/28/20 07:16


Blood Culture x2 Reflex Set [OM.PC] Stat 





10/28/20 07:30


Dextrose 5%-0.9% NaCl [Dextrose 5%-Normal Saline] 1,000 ml IV ASDIRECTED 





10/28/20 07:35


Head wo Cont [CT] Stat 





10/28/20 07:36


Cervical Spine wo Cont [CT] Stat 





10/28/20 07:39


Chest wo Cont [CT] Stat 





10/28/20 07:49


CULTURE BLOOD [BC] Stat 





10/28/20 08:04


CULTURE BLOOD [BC] Stat 





10/28/20 08:15


Insert Lr Catheter [Insert Urinary Catheter] [OM.PC] Stat 


Urinary Catheter Assessment [RC] ASDIRECTED 





10/28/20 10:13


LACTIC ACID [CHEM] Stat 





10/28/20 10:15


Lactated Ringers [Ringers, Lactated] 1,000 ml IV ASDIRECTED

## 2020-10-28 NOTE — PCM.HP.2
H&P History of Present Illness





- General


Date of Service: 10/28/20


Admit Problem/Dx: 


                           Admission Diagnosis/Problem





Admission Diagnosis/Problem      Nausea and vomiting








Source of Information: Patient, Provider


History Limitations: Reports: No Limitations





- History of Present Illness


Initial Comments - Free Text/Narative: 





80-year-old male presents to the ED per [facility].  Patient reports he

has been receiving chemotherapy for stage IV prostate cancer which has spread to

his bones for the last 2-1/2 months.  Initial diagnosis was about 6 years ago 

and he was treated with cesium seed implants . PSA started to go up over the 

last 18 months.He recieved further radiation to his pelvis initially .  Patient 

has  received 7 course out of 12 proposed courses of chemotherapy on Friday October 23.  He states usually it makes him have significant nausea difficulty 

eating and some diarrhea.  Vomiting started last evening about 2000 hrs. and he 

vomited recurrently all night long.  Diarrhea started about midnight and he 

proposes that he has had at least 6 large volume stool losses.  He did not 

notice blood in emesis or diarrhea per se.  This morning he was lightheaded di

zzy on his way into the bathroom and fell to the floor injuring his left ribs he

believes on the tile floor.  He does not believe he lost consciousness 

completely.  He could not get up because of weakness and crawled out to the 

hallway where his wife met him.  She then called the ambulance.  Patient remains

weak and nauseated.  No definitive fever or chills.  Appetite has been poor for 

the last 2 and half days.  Of note the patient is on Xarelto 15mg once daily.


  ** Left Chest


Pain Score (Numeric/FACES): 8





- Related Data


Allergies/Adverse Reactions: 


                                    Allergies











Allergy/AdvReac Type Severity Reaction Status Date / Time


 


metoprolol AdvReac  Anxiety Verified 10/28/20 06:57


 


rosuvastatin [From Crestor] AdvReac  Anxiety Verified 10/28/20 06:57


 


Statins-Hmg-Coa Reductase AdvReac  Leg Cramps Verified 10/28/20 06:57





Inhibitor     











Home Medications: 


                                    Home Meds





traMADol [Ultram] 50 mg PO TID PRN 07/20/17 [History]


Pramipexole [Mirapex] 0.75 mg PO BEDTIME 12/08/17 [History]


Apixaban [Eliquis] 5 mg PO BID 10/28/20 [History]


Famotidine 20 mg PO BEDTIME PRN 10/28/20 [History]


Losartan [Cozaar] 100 mg PO DAILY 10/28/20 [History]


Magnesium Oxide 400 mg PO DAILY 10/28/20 [History]


Nitroglycerin 0.6 mg SL ASDIRECTED PRN 10/28/20 [History]


Ondansetron [Zofran Odt] 8 mg SL Q8H PRN 10/28/20 [History]


Potassium Chloride [K-Tab ER] 10 meq PO ASDIRECTED 10/28/20 [History]


Pravastatin [Pravachol] 40 mg PO DAILY 10/28/20 [History]


Prochlorperazine [Compazine] 10 mg PO QID PRN 10/28/20 [History]


amLODIPine [Norvasc] 5 mg PO DAILY 10/28/20 [History]


hydroCHLOROthiazide [Hydrochlorothiazide] 25 mg PO DAILY 10/28/20 [History]


predniSONE 5 mg PO BID 10/28/20 [History]


traMADol [Ultram] 25 - 50 mg PO TID PRN 10/28/20 [History]











Past Medical History





- Past Health History


Medical/Surgical History: Denies Medical/Surgical History


HEENT History: Reports: None


Cardiovascular History: Reports: Angina, Blood Clots/VTE/DVT, CAD, High 

Cholesterol, Hypertension


Other Cardiovascular History: peripheral edema


Respiratory History: Reports: PE, SOB, Other (See Below)


Other Respiratory History: lung nodules


Gastrointestinal History: Reports: Bowel Obstruction, Colon Polyp, 

Diverticulosis, Gastritis, GERD, Helicobacter Pylori, Hiatal Hernia


Other Gastrointestinal History: esophagitis


Genitourinary History: Reports: Prostate Disorder


OB/GYN History: Reports: None


Musculoskeletal History: Reports: Osteoarthritis


Other Musculoskeletal History: restless leg syndrome


Neurological History: Reports: TIA, Other (See Below)


Other Neuro History: Tremor that started with recent chemo treatments.


Psychiatric History: Reports: None


Endocrine/Metabolic History: Reports: None


Hematologic History: Reports: Anemia


Immunologic History: Reports: None


Oncologic (Cancer) History: Reports: Metastatic, Prostate


Other Oncologic History: prostate ca with mets to bone, bone marrow biopsy.  

Initial  diagnosis of prostate cancer was approximately 2014 and initial 

treatment was cesium seed implants.


Dermatologic History: Reports: Melanoma


Other Dermatologic History: removal of melanoma





- Infectious Disease History


Infectious Disease History: Reports: Chicken Pox, Measles, Mumps, Shingles





- Past Surgical History


HEENT Surgical History: Reports: None


Cardiovascular Surgical History: Reports: None


Respiratory Surgical History: Reports: None


GI Surgical History: Reports: Colonoscopy, EGD, Other (See Below)


Other GI Surgeries/Procedures: Bowel resection


Male  Surgical History: Reports: None


Neurological Surgical History: Reports: None


Other Musculoskeletal Surgeries/Procedures:: osteoarthritis of left shoulder





Social & Family History





- Family History


Family Medical History: Noncontributory


HEENT: Reports: Hearing Impairment


Other HEENT Family History: Mother


Cardiac: Reports: Heart Failure, Pacemaker


Other Cardiac Family History: brother-pacemaker.  mother- HF


Respiratory: Reports: None


GI: Reports: None


Musculoskeletal: Reports: Arthritis


Neurological: Reports: Alzheimers Disease


Other Neurological Family History: Dad-alzheimer's


Psychiatric: Reports: None





- Tobacco Use


Tobacco Use Status *Q: Former Tobacco User


Years of Tobacco use: 10


Packs/Tins Daily: 1


Used Tobacco, but Quit: Yes


Month/Year Tobacco Last Used: 01/1970


Second Hand Smoke Exposure: No





- Caffeine Use


Caffeine Use: Reports: Coffee


Other Caffeine Use: Drinks 3-4 cups every morning





- Recreational Drug Use


Recreational Drug Use: No





- Living Situation & Occupation


Living situation: Reports: 


Occupation: Retired





H&P Review of Systems





- Review of Systems:


Review Of Systems: See Below


General: Reports: Malaise, Weakness


HEENT: Reports: No Symptoms


Pulmonary: Reports: No Symptoms


Cardiovascular: Reports: No Symptoms


Gastrointestinal: Reports: Diarrhea, Decreased Appetite, Nausea, Vomiting


Genitourinary: Reports: No Symptoms


Musculoskeletal: Reports: Back Pain (Metastatic cancer), Leg Pain (Restless leg 

syndrome), Other (Rib pain from falling)


Skin: Reports: No Symptoms


Psychiatric: Reports: No Symptoms


Neurological: Reports: No Symptoms





Exam





- Exam


Exam: See Below





- Vital Signs


Vital Signs: 


                                Last Vital Signs











Temp  98.2 F   10/28/20 11:01


 


Pulse  62   10/28/20 11:01


 


Resp  20   10/28/20 11:01


 


BP  111/74   10/28/20 11:01


 


Pulse Ox  97   10/28/20 11:01











Weight: 179 lb





- Exam


Quality Assessment: DVT Prophylaxis (mateo hose, pt takes eliquis).  No: 

Supplemental Oxygen


General: Alert, Oriented, Cooperative


HEENT: Conjunctiva Clear, EACs Clear, Pupils Equal, Pupils Reactive.  No: Mucosa

 Moist & Pink (dry)


Neck: Supple, Trachea Midline.  No: Lymphadenopathy


Lungs: Clear to Auscultation, Normal Respiratory Effort


Cardiovascular: Regular Rate, Regular Rhythm, Normal S1, Normal S2


GI/Abdominal Exam: Normal Bowel Sounds, Soft, Non-Tender, No Distention


 (Male) Exam: Deferred


Rectal (Males) Exam: Deferred


Back Exam: Vertebral Tenderness


Extremities: Normal Inspection, Normal Range of Motion, Non-Tender, Normal 

Capillary Refill, Pedal Edema


Peripheral Pulses: 1+: Dorsalis Pedis (L), Dorsalis Pedis (R), 2+: Radial (L), 

Radial (R)


Skin: Warm, Dry, Intact


Neurological: Strength Equal Bilateral, Normal Speech


Neuro Extensive - Mental Status: Alert, Oriented x3, Normal Mood/Affect, Normal 

Cognition, Memory Intact


Psychiatric: Alert, Normal Affect, Normal Mood





- Patient Data


Lab Results Last 24 hrs: 


                         Laboratory Results - last 24 hr











  10/28/20 10/28/20 10/28/20 Range/Units





  07:40 07:49 07:49 


 


WBC   7.01   (4.23-9.07)  K/mm3


 


RBC   4.02 L   (4.63-6.08)  M/mm3


 


Hgb   10.5 L D   (13.7-17.5)  gm/dl


 


Hct   32.2 L   (40.1-51.0)  %


 


MCV   80.1  D   (79.0-92.2)  fl


 


MCH   26.1   (25.7-32.2)  pg


 


MCHC   32.6   (32.2-35.5)  g/dl


 


RDW Std Deviation   46.9 H   (35.1-43.9)  fL


 


Plt Count   197   (163-337)  K/mm3


 


MPV   10.2   (9.4-12.3)  fl


 


Neut % (Auto)   77.2 H   (34.0-67.9)  %


 


Lymph % (Auto)   18.5 L   (21.8-53.1)  %


 


Mono % (Auto)   1.9 L   (5.3-12.2)  %


 


Eos % (Auto)   0 L   (0.8-7.0)  


 


Baso % (Auto)   1.3 H   (0.1-1.2)  %


 


Neut # (Auto)   5.41 H   (1.78-5.38)  K/mm3


 


Lymph # (Auto)   1.30 L   (1.32-3.57)  K/mm3


 


Mono # (Auto)   0.13 L   (0.30-0.82)  K/mm3


 


Eos # (Auto)   0.00 L   (0.04-0.54)  K/mm3


 


Baso # (Auto)   0.09 H   (0.01-0.08)  K/mm3


 


ESR     (0-15)  mm/hr


 


PT    11.4  (9.7-12.0)  SECONDS


 


INR    1.07  


 


APTT    28.5  (21.7-31.4)  SECONDS


 


Sodium     (136-145)  mEq/L


 


Potassium     (3.5-5.1)  mEq/L


 


Chloride     ()  mEq/L


 


Carbon Dioxide     (21-32)  mEq/L


 


Anion Gap     (5-15)  


 


BUN     (7-18)  mg/dL


 


Creatinine     (0.7-1.3)  mg/dL


 


Est Cr Clr Drug Dosing     mL/min


 


Estimated GFR (MDRD)     (>60)  mL/min


 


BUN/Creatinine Ratio     (14-18)  


 


Glucose     ()  mg/dL


 


Lactic Acid     (0.4-2.0)  mmol/L


 


Calcium     (8.5-10.1)  mg/dL


 


Magnesium     (1.8-2.4)  mg/dl


 


Total Bilirubin     (0.2-1.0)  mg/dL


 


AST     (15-37)  U/L


 


ALT     (16-63)  U/L


 


Alkaline Phosphatase     ()  U/L


 


Troponin I     (0.00-0.056)  ng/mL


 


C-Reactive Protein     (<1.0)  mg/dL


 


NT-Pro-B Natriuret Pep     (0-450)  pg/mL


 


Total Protein     (6.4-8.2)  g/dl


 


Albumin     (3.4-5.0)  g/dl


 


Globulin     gm/dL


 


Albumin/Globulin Ratio     (1-2)  


 


Lipase     ()  U/L


 


Urine Color     (Yellow)  


 


Urine Appearance     (Clear)  


 


Urine pH     (5.0-8.0)  


 


Ur Specific Gravity     (1.005-1.030)  


 


Urine Protein     (Negative)  


 


Urine Glucose (UA)     (Negative)  


 


Urine Ketones     (Negative)  


 


Urine Occult Blood     (Negative)  


 


Urine Nitrite     (Negative)  


 


Urine Bilirubin     (Negative)  


 


Urine Urobilinogen     (0.2-1.0)  


 


Ur Leukocyte Esterase     (Negative)  


 


U Hyaline Cast (Auto)     (0-5)  /lpf


 


Urine RBC     (0-5)  /hpf


 


Urine WBC     (0-5)  /hpf


 


Ur Epithelial Cells     (0-5)  /hpf


 


Urine Bacteria     (FEW)  /hpf


 


Urine Mucus     (FEW)  /hpf


 


SARS-CoV-2 RNA (SAKSHI)  Negative    (NEGATIVE)  














  10/28/20 10/28/20 10/28/20 Range/Units





  07:49 07:49 07:49 


 


WBC     (4.23-9.07)  K/mm3


 


RBC     (4.63-6.08)  M/mm3


 


Hgb     (13.7-17.5)  gm/dl


 


Hct     (40.1-51.0)  %


 


MCV     (79.0-92.2)  fl


 


MCH     (25.7-32.2)  pg


 


MCHC     (32.2-35.5)  g/dl


 


RDW Std Deviation     (35.1-43.9)  fL


 


Plt Count     (163-337)  K/mm3


 


MPV     (9.4-12.3)  fl


 


Neut % (Auto)     (34.0-67.9)  %


 


Lymph % (Auto)     (21.8-53.1)  %


 


Mono % (Auto)     (5.3-12.2)  %


 


Eos % (Auto)     (0.8-7.0)  


 


Baso % (Auto)     (0.1-1.2)  %


 


Neut # (Auto)     (1.78-5.38)  K/mm3


 


Lymph # (Auto)     (1.32-3.57)  K/mm3


 


Mono # (Auto)     (0.30-0.82)  K/mm3


 


Eos # (Auto)     (0.04-0.54)  K/mm3


 


Baso # (Auto)     (0.01-0.08)  K/mm3


 


ESR   13   (0-15)  mm/hr


 


PT     (9.7-12.0)  SECONDS


 


INR     


 


APTT     (21.7-31.4)  SECONDS


 


Sodium  133 L    (136-145)  mEq/L


 


Potassium  3.7    (3.5-5.1)  mEq/L


 


Chloride  98    ()  mEq/L


 


Carbon Dioxide  24    (21-32)  mEq/L


 


Anion Gap  14.7    (5-15)  


 


BUN  21 H    (7-18)  mg/dL


 


Creatinine  1.1    (0.7-1.3)  mg/dL


 


Est Cr Clr Drug Dosing  55.14    mL/min


 


Estimated GFR (MDRD)  > 60    (>60)  mL/min


 


BUN/Creatinine Ratio  19.1 H    (14-18)  


 


Glucose  201 H    ()  mg/dL


 


Lactic Acid     (0.4-2.0)  mmol/L


 


Calcium  7.5 L D    (8.5-10.1)  mg/dL


 


Magnesium  2.7 H    (1.8-2.4)  mg/dl


 


Total Bilirubin  0.9    (0.2-1.0)  mg/dL


 


AST  19    (15-37)  U/L


 


ALT  22    (16-63)  U/L


 


Alkaline Phosphatase  106    ()  U/L


 


Troponin I  < 0.017    (0.00-0.056)  ng/mL


 


C-Reactive Protein  3.4 H*    (<1.0)  mg/dL


 


NT-Pro-B Natriuret Pep    229  (0-450)  pg/mL


 


Total Protein  5.9 L    (6.4-8.2)  g/dl


 


Albumin  3.0 L    (3.4-5.0)  g/dl


 


Globulin  2.9    gm/dL


 


Albumin/Globulin Ratio  1.0    (1-2)  


 


Lipase     ()  U/L


 


Urine Color     (Yellow)  


 


Urine Appearance     (Clear)  


 


Urine pH     (5.0-8.0)  


 


Ur Specific Gravity     (1.005-1.030)  


 


Urine Protein     (Negative)  


 


Urine Glucose (UA)     (Negative)  


 


Urine Ketones     (Negative)  


 


Urine Occult Blood     (Negative)  


 


Urine Nitrite     (Negative)  


 


Urine Bilirubin     (Negative)  


 


Urine Urobilinogen     (0.2-1.0)  


 


Ur Leukocyte Esterase     (Negative)  


 


U Hyaline Cast (Auto)     (0-5)  /lpf


 


Urine RBC     (0-5)  /hpf


 


Urine WBC     (0-5)  /hpf


 


Ur Epithelial Cells     (0-5)  /hpf


 


Urine Bacteria     (FEW)  /hpf


 


Urine Mucus     (FEW)  /hpf


 


SARS-CoV-2 RNA (SAKSHI)     (NEGATIVE)  














  10/28/20 10/28/20 10/28/20 Range/Units





  07:49 07:49 08:15 


 


WBC     (4.23-9.07)  K/mm3


 


RBC     (4.63-6.08)  M/mm3


 


Hgb     (13.7-17.5)  gm/dl


 


Hct     (40.1-51.0)  %


 


MCV     (79.0-92.2)  fl


 


MCH     (25.7-32.2)  pg


 


MCHC     (32.2-35.5)  g/dl


 


RDW Std Deviation     (35.1-43.9)  fL


 


Plt Count     (163-337)  K/mm3


 


MPV     (9.4-12.3)  fl


 


Neut % (Auto)     (34.0-67.9)  %


 


Lymph % (Auto)     (21.8-53.1)  %


 


Mono % (Auto)     (5.3-12.2)  %


 


Eos % (Auto)     (0.8-7.0)  


 


Baso % (Auto)     (0.1-1.2)  %


 


Neut # (Auto)     (1.78-5.38)  K/mm3


 


Lymph # (Auto)     (1.32-3.57)  K/mm3


 


Mono # (Auto)     (0.30-0.82)  K/mm3


 


Eos # (Auto)     (0.04-0.54)  K/mm3


 


Baso # (Auto)     (0.01-0.08)  K/mm3


 


ESR     (0-15)  mm/hr


 


PT     (9.7-12.0)  SECONDS


 


INR     


 


APTT     (21.7-31.4)  SECONDS


 


Sodium     (136-145)  mEq/L


 


Potassium     (3.5-5.1)  mEq/L


 


Chloride     ()  mEq/L


 


Carbon Dioxide     (21-32)  mEq/L


 


Anion Gap     (5-15)  


 


BUN     (7-18)  mg/dL


 


Creatinine     (0.7-1.3)  mg/dL


 


Est Cr Clr Drug Dosing     mL/min


 


Estimated GFR (MDRD)     (>60)  mL/min


 


BUN/Creatinine Ratio     (14-18)  


 


Glucose     ()  mg/dL


 


Lactic Acid  2.2 H*    (0.4-2.0)  mmol/L


 


Calcium     (8.5-10.1)  mg/dL


 


Magnesium     (1.8-2.4)  mg/dl


 


Total Bilirubin     (0.2-1.0)  mg/dL


 


AST     (15-37)  U/L


 


ALT     (16-63)  U/L


 


Alkaline Phosphatase     ()  U/L


 


Troponin I     (0.00-0.056)  ng/mL


 


C-Reactive Protein     (<1.0)  mg/dL


 


NT-Pro-B Natriuret Pep     (0-450)  pg/mL


 


Total Protein     (6.4-8.2)  g/dl


 


Albumin     (3.4-5.0)  g/dl


 


Globulin     gm/dL


 


Albumin/Globulin Ratio     (1-2)  


 


Lipase   28 L   ()  U/L


 


Urine Color    Dark yellow  (Yellow)  


 


Urine Appearance    Slt cloudy H  (Clear)  


 


Urine pH    7.5  (5.0-8.0)  


 


Ur Specific Gravity    1.025  (1.005-1.030)  


 


Urine Protein    2+ H  (Negative)  


 


Urine Glucose (UA)    Negative  (Negative)  


 


Urine Ketones    Trace H  (Negative)  


 


Urine Occult Blood    Negative  (Negative)  


 


Urine Nitrite    Negative  (Negative)  


 


Urine Bilirubin    Negative  (Negative)  


 


Urine Urobilinogen    1.0  (0.2-1.0)  


 


Ur Leukocyte Esterase    Negative  (Negative)  


 


U Hyaline Cast (Auto)    0-5  (0-5)  /lpf


 


Urine RBC    0-5  (0-5)  /hpf


 


Urine WBC    0-5  (0-5)  /hpf


 


Ur Epithelial Cells    Not seen  (0-5)  /hpf


 


Urine Bacteria    Few  (FEW)  /hpf


 


Urine Mucus    Few  (FEW)  /hpf


 


SARS-CoV-2 RNA (SAKSHI)     (NEGATIVE)  














  10/28/20 Range/Units





  11:06 


 


WBC   (4.23-9.07)  K/mm3


 


RBC   (4.63-6.08)  M/mm3


 


Hgb   (13.7-17.5)  gm/dl


 


Hct   (40.1-51.0)  %


 


MCV   (79.0-92.2)  fl


 


MCH   (25.7-32.2)  pg


 


MCHC   (32.2-35.5)  g/dl


 


RDW Std Deviation   (35.1-43.9)  fL


 


Plt Count   (163-337)  K/mm3


 


MPV   (9.4-12.3)  fl


 


Neut % (Auto)   (34.0-67.9)  %


 


Lymph % (Auto)   (21.8-53.1)  %


 


Mono % (Auto)   (5.3-12.2)  %


 


Eos % (Auto)   (0.8-7.0)  


 


Baso % (Auto)   (0.1-1.2)  %


 


Neut # (Auto)   (1.78-5.38)  K/mm3


 


Lymph # (Auto)   (1.32-3.57)  K/mm3


 


Mono # (Auto)   (0.30-0.82)  K/mm3


 


Eos # (Auto)   (0.04-0.54)  K/mm3


 


Baso # (Auto)   (0.01-0.08)  K/mm3


 


ESR   (0-15)  mm/hr


 


PT   (9.7-12.0)  SECONDS


 


INR   


 


APTT   (21.7-31.4)  SECONDS


 


Sodium   (136-145)  mEq/L


 


Potassium   (3.5-5.1)  mEq/L


 


Chloride   ()  mEq/L


 


Carbon Dioxide   (21-32)  mEq/L


 


Anion Gap   (5-15)  


 


BUN   (7-18)  mg/dL


 


Creatinine   (0.7-1.3)  mg/dL


 


Est Cr Clr Drug Dosing   mL/min


 


Estimated GFR (MDRD)   (>60)  mL/min


 


BUN/Creatinine Ratio   (14-18)  


 


Glucose   ()  mg/dL


 


Lactic Acid  1.4  (0.4-2.0)  mmol/L


 


Calcium   (8.5-10.1)  mg/dL


 


Magnesium   (1.8-2.4)  mg/dl


 


Total Bilirubin   (0.2-1.0)  mg/dL


 


AST   (15-37)  U/L


 


ALT   (16-63)  U/L


 


Alkaline Phosphatase   ()  U/L


 


Troponin I   (0.00-0.056)  ng/mL


 


C-Reactive Protein   (<1.0)  mg/dL


 


NT-Pro-B Natriuret Pep   (0-450)  pg/mL


 


Total Protein   (6.4-8.2)  g/dl


 


Albumin   (3.4-5.0)  g/dl


 


Globulin   gm/dL


 


Albumin/Globulin Ratio   (1-2)  


 


Lipase   ()  U/L


 


Urine Color   (Yellow)  


 


Urine Appearance   (Clear)  


 


Urine pH   (5.0-8.0)  


 


Ur Specific Gravity   (1.005-1.030)  


 


Urine Protein   (Negative)  


 


Urine Glucose (UA)   (Negative)  


 


Urine Ketones   (Negative)  


 


Urine Occult Blood   (Negative)  


 


Urine Nitrite   (Negative)  


 


Urine Bilirubin   (Negative)  


 


Urine Urobilinogen   (0.2-1.0)  


 


Ur Leukocyte Esterase   (Negative)  


 


U Hyaline Cast (Auto)   (0-5)  /lpf


 


Urine RBC   (0-5)  /hpf


 


Urine WBC   (0-5)  /hpf


 


Ur Epithelial Cells   (0-5)  /hpf


 


Urine Bacteria   (FEW)  /hpf


 


Urine Mucus   (FEW)  /hpf


 


SARS-CoV-2 RNA (SAKSHI)   (NEGATIVE)  











Result Diagrams: 


                                 10/28/20 07:49





                                 10/28/20 07:49





Sepsis Event Note





- Evaluation


Sepsis Screening Result: No Definite Risk





- Focused Exam


Vital Signs: 


                                   Vital Signs











  Temp Temp Pulse Pulse Resp BP BP


 


 10/28/20 11:01  98.2 F   62   20  111/74 


 


 10/28/20 10:55   97.8 F   68  18   92/47 L


 


 10/28/20 06:57   98.2 F   72  16   90/44 L














  Pulse Ox


 


 10/28/20 11:01  97


 


 10/28/20 10:55  99


 


 10/28/20 06:57  100














- Problem List


(1) Adenocarcinoma of prostate, stage 4


SNOMED Code(s): 230246875, 273598481


   ICD Code: C61 - MALIGNANT NEOPLASM OF PROSTATE   Status: Acute   Priority: 

Medium   Current Visit: Yes   





(2) Adverse effect of chemotherapy


SNOMED Code(s): 055708597


   ICD Code: T45.1X5A - ADVERSE EFFECT OF ANTINEOPLASTIC AND IMMUNOSUP DRUGS, 

INIT   Status: Acute   Priority: High   Current Visit: Yes   


Qualifiers: 


   Encounter type: initial encounter   Qualified Code(s): T45.1X5A - Adverse 

effect of antineoplastic and immunosuppressive drugs, initial encounter   





(3) Intractable nausea and vomiting


SNOMED Code(s): 483672732


   ICD Code: R11.2 - NAUSEA WITH VOMITING, UNSPECIFIED   Status: Acute   

Priority: High   Current Visit: Yes   





(4) Severe diarrhea


SNOMED Code(s): 355071005


   ICD Code: K52.9 - NONINFECTIVE GASTROENTERITIS AND COLITIS, UNSPECIFIED   

Status: Acute   Priority: High   Current Visit: Yes   





(5) Vomiting


SNOMED Code(s): 655496680


   ICD Code: R11.10 - VOMITING, UNSPECIFIED   Status: Acute   Priority: High   

Current Visit: Yes   


Qualifiers: 


   Vomiting type: bilious vomiting   Nausea presence: with nausea   Qualified 

Code(s): R11.14 - Bilious vomiting   


Problem List Initiated/Reviewed/Updated: Yes


Orders Last 24hrs: 


                               Active Orders 24 hr











 Category Date Time Status


 


 Admission Status [Patient Status] [ADT] Routine ADT  10/28/20 10:18 Active


 


 Patient Status [ADT] Routine ADT  10/28/20 10:41 Active


 


 Antiembolic Devices [RC] PER UNIT ROUTINE Care  10/28/20 10:52 Active


 


 Blood Glucose Check, Bedside [RC] ONETIME Care  10/28/20 07:15 Active


 


 Height and Weight [RC] 04 Care  10/28/20 10:40 Active


 


 Insert Lr Catheter [Insert Urinary Catheter] [OM.PC] Care  10/28/20 08:15 

Ordered





 Stat   


 


 Intake and Output [RC] 04,16 Care  10/28/20 10:47 Active


 


 Oxygen Therapy [RC] PRN Care  10/28/20 10:41 Active


 


 Up With Assistance [RC] ASDIRECTED Care  10/28/20 10:40 Active


 


 VTE/DVT Education [RC] PER UNIT ROUTINE Care  10/28/20 10:41 Active


 


 Vital Signs [RC] Q4HR Care  10/28/20 10:41 Active


 


 Consult to Case Management/ [CONS] Cons  10/28/20 10:40 Active





 Routine   


 


 OT Evaluation and Treatment [CONS] Routine Cons  10/28/20 10:40 Active


 


 PT Evaluation and Treatment [CONS] Routine Cons  10/28/20 10:40 Active


 


 [place] Diet [DIET] Diet  10/28/20 Lunch Active


 


 Cervical Spine wo Cont [CT] Stat Exams  10/28/20 07:36 Taken


 


 Chest wo Cont [CT] Stat Exams  10/28/20 07:39 Taken


 


 Head wo Cont [CT] Stat Exams  10/28/20 07:35 Taken


 


 CBC WITH AUTO DIFF [HEME] AM Lab  10/29/20 05:11 Ordered


 


 COMPREHENSIVE METABOLIC PN,CMP [CHEM] AM Lab  10/29/20 05:11 Ordered


 


 CULTURE BLOOD [BC] Stat Lab  10/28/20 07:49 Received


 


 CULTURE BLOOD [BC] Stat Lab  10/28/20 08:04 Received


 


 MAGNESIUM [CHEM] AM Lab  10/29/20 05:11 Ordered


 


 PHOSPHORUS [CHEM] AM Lab  10/29/20 05:11 Ordered


 


 Acetaminophen [TylenoL] Med  10/28/20 10:40 Active





 650 mg PO Q4H PRN   


 


 Dextrose 5%-0.9% NaCl [Dextrose 5%-Normal Saline] 1,000 Med  10/28/20 07:30 

Active





 ml   





 IV ASDIRECTED   


 


 HYDROmorphone [Dilaudid] Med  10/28/20 10:40 Active





 0.25 mg IVPUSH Q2H PRN   


 


 Lactated Ringers [Ringers, Lactated] 1,000 ml Med  10/28/20 10:15 Active





 IV ASDIRECTED   


 


 Ondansetron [Zofran] Med  10/28/20 10:40 Active





 4 mg IV Q4H PRN   


 


 oxyCODONE Med  10/28/20 10:40 Active





 5 mg PO Q4H PRN   


 


 Antiembolic Hose [OM.PC] Per Unit Routine Ot  10/28/20 10:48 Ordered


 


 Blood Culture x2 Reflex Set [OM.PC] Stat Ot  10/28/20 07:16 Ordered








                                Medication Orders





Acetaminophen (Tylenol)  650 mg PO Q4H PRN


   PRN Reason: Pain (Mild 1-3)/fever


Hydromorphone HCl (Dilaudid)  0.25 mg IVPUSH Q2H PRN


   PRN Reason: Pain (severe 7-10)


Dextrose/Sodium Chloride (Dextrose 5%-Normal Saline)  1,000 mls @ 999 mls/hr IV 

ASDIRECTED [facility]


   Last Admin: 10/28/20 07:37  Dose: 999 mls/hr


   Documented by: NAYELI


Lactated Ringer's (Ringers, Lactated)  1,000 mls @ 75 mls/hr IV ASDIRECTED [facility]


   Last Admin: 10/28/20 09:50  Dose: 75 mls/hr


   Documented by: NAYELI


Ondansetron HCl (Zofran)  4 mg IV Q4H PRN


   PRN Reason: Nausea/Vomiting


Oxycodone HCl (Oxycodone)  5 mg PO Q4H PRN


   PRN Reason: Pain (moderate 4-6)








Assessment/Plan Comment:: 





10/28/20


* Severe vomiting and diarrhea that started last evening.


* History of metastatic prostate cancer stage IV currently being treated with 

  chemotherapy which causes significant nausea and diarrhea with each treatment.


* Near syncopal episode that occurred this morning due to volume loss, resulting

   in a fall and trauma to his ribs.


* Poor appetite for the last 2-1/2 days.


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


* Labs reveal a white count of 7.01, hemoglobin of 10.5, sodium 133, potassium 

  3.7, BUN 21, creatinine 1.1, GFR greater than 60, lactic acid 2.2, repeat 

  lactic acid 1.4, Troponin less than 0.017, C-reactive protein 3.4, , 

  urinalysis was unremarkable for infection.


* CT of the chest done without contrast reveals no obvious rib fractures or 

  pulmonary contusion.  There is no pneumothorax.  Mild changes of emphysema 

  with scarring in the lung apices right worse than the left.  Calcified 

  granuloma right middle lobe of lung.  Mild cardiomegaly.  Indwelling left 

  central venous catheter with tip in the superior vena cava.  There is evidence

   of diffuse sclerotic skeletal metastases which have progressed since 

  7/19/2017.  No fractures are identified degenerative arthritis throughout the 

  thoracic spine diffuse atherosclerosis of the thoracic and abdominal aorta 

  without any aneurysmal change.  Visualized portions of the upper abdomen 

  reveal liver to have fatty infiltration.  Gallbladder does not reveal any 

  calcified gallstones.  Stomach appears normal.  Spleen is slightly enlarged.  

  Kidneys are atrophic bilaterally without any obvious stones.  No signs of 

  urinary tract obstruction.  CT scans of the brain reveal no skull fractures.  

  No intracranial bleeding or mass-effect.  Diffuse small vessel ischemic change

   appreciated both basal ganglia.  No evidence of infarct identified.  There is

   some motion artifact on the CT.


PLAN: 


* Patient for observation status


* Lactated Ringer's at 75 mL's per hour.  We will continue at this rate as 

  patient has a history of congestive heart failure and I do not want to 

  overload him.


* Patient takes Eliquis we will continue this for DVT prophylaxis and order MATEO 

  stockings as he does wear these at home as well.


* Pain management as patient does have chronic back pain due to metastatic 

  cancer and restless leg syndrome.


*  to consult for discharge planning


* Dietary to consult.


* PT/OT eval and treat.


* Benadryl for nausea/vomiting





- Mortality Measure


Prognosis:: Good

## 2020-10-29 RX ADMIN — PREDNISONE SCH: 5 TABLET ORAL at 18:56

## 2020-10-29 RX ADMIN — LOSARTAN POTASSIUM SCH MG: 100 TABLET, FILM COATED ORAL at 14:26

## 2020-10-29 RX ADMIN — APIXABAN SCH MG: 5 TABLET, FILM COATED ORAL at 21:32

## 2020-10-29 RX ADMIN — HYDROCHLOROTHIAZIDE SCH MG: 25 TABLET ORAL at 14:27

## 2020-10-29 RX ADMIN — ALENDRONATE SODIUM SCH MG: 70 TABLET ORAL at 14:28

## 2020-10-29 NOTE — CT
PROCEDURE INFORMATION:

Exam: CT Chest With Contrast

Exam date and time: 10/29/2020 10:30 AM

Age: 80 years old

Clinical indication: Patient HX: Chest pain ? pe; Additional info: Prior reports
sent in todays images



TECHNIQUE:

Imaging protocol: Computed tomography of the chest with intravenous contrast.

3D rendering (Not supervised by radiologist): MIP and/or 3D reconstructed images
were created

by the technologist.

Contrast material: ISOVUE 370; Contrast volume: 125 ml; Contrast route: 
INTRAVENOUS (IV);

Other contrast: Oral;



COMPARISON:

1. CT Chest wo Cont 10/28/2020 7:44 AM

2. CT - Chest Abdomen Pelvis w Cont 7/19/2017 7:09:51 PM



FINDINGS:

Tubes, catheters and devices: An infusion port is present.

Lungs: There are mild paraseptal emphysematous changes Bilateral dependent 
atelectasis. Calcified

granuloma within the right upper lobe

Pleural space: Unremarkable. No pneumothorax. No pleural effusion.

Heart: Moderate coronary artery calcification. Normal heart size.There is no 
flattening of the

interventricular septum, paradoxical interventricular septum bowing, or right 
ventricular enlargement

to suggest right ventricular strain.

Mediastinal space: Circumferential thickening of the distal esophagus consistent
with esophagitis or

reflux.

Pulmonary arteries: No evidence of pulmonary emboli.

Aorta: Unremarkable. No aortic aneurysm.

Lymph nodes: Unremarkable. No enlarged lymph nodes.

Bones/joints: Extensive blastic osseous metastatic disease has progressed since 
July 2017.

Soft tissues: Unremarkable.

Other findings: .



IMPRESSION:

1. Extensive blastic osseous metastatic disease has progressed since July 2017.

2. No evidence of pulmonary emboli.



Thank you for allowing us to participate in the care of your patient.



Dictated and Authenticated by: Carlos Coker MD

10/29/2020 12:35 PM Central Time ([place] & [place])

DEYSI

## 2020-10-29 NOTE — PCM.PN
- General Info


Date of Service: 10/29/20


Admission Dx/Problem (Free Text): 


                           Admission Diagnosis/Problem





Admission Diagnosis/Problem      Nausea and vomiting








Subjective Update: 





Developed chest pain this morning and upper abdominal pain.  States it is the 

most severe pain he has ever had.


Functional Status: Reports: Urinating.  Denies: Pain Controlled (Severe chest 

pain and upper abdominal pain.), Ambulating





- Review of Systems


General: Reports: Weakness


HEENT: Reports: No Symptoms


Pulmonary: Reports: No Symptoms


Cardiovascular: Reports: Chest Pain.  Denies: Edema


Gastrointestinal: Reports: Abdominal Pain (Right upper quadrant abdominal pain).

 Denies: Nausea, Vomiting


Genitourinary: Reports: No Symptoms


Musculoskeletal: Reports: No Symptoms


Skin: Reports: No Symptoms


Neurological: Reports: No Symptoms


Psychiatric: Reports: No Symptoms





- Patient Data


Vitals - Most Recent: 


                                Last Vital Signs











Temp  98.2 F   10/29/20 04:38


 


Pulse  71   10/29/20 08:23


 


Resp  16   10/29/20 04:38


 


BP  160/65 H  10/29/20 08:34


 


Pulse Ox  99   10/29/20 08:23











Weight - Most Recent: 173 lb 9.6 oz


I&O - Last 24 Hours: 


                                 Intake & Output











 10/28/20 10/29/20 10/29/20





 22:59 06:59 14:59


 


Intake Total 280 1100 


 


Output Total 1000 2300 


 


Balance -720 -1200 











Lab Results Last 24 Hours: 


                         Laboratory Results - last 24 hr











  10/29/20 10/29/20 10/29/20 Range/Units





  05:27 05:27 08:21 


 


WBC  2.88 L    (4.23-9.07)  K/mm3


 


RBC  3.80 L    (4.63-6.08)  M/mm3


 


Hgb  9.8 L    (13.7-17.5)  gm/dl


 


Hct  30.9 L    (40.1-51.0)  %


 


MCV  81.3    (79.0-92.2)  fl


 


MCH  25.8    (25.7-32.2)  pg


 


MCHC  31.7 L    (32.2-35.5)  g/dl


 


RDW Std Deviation  48.2 H    (35.1-43.9)  fL


 


Plt Count  184    (163-337)  K/mm3


 


MPV  10.6    (9.4-12.3)  fl


 


Neut % (Auto)  52.4    (34.0-67.9)  %


 


Lymph % (Auto)  38.9    (21.8-53.1)  %


 


Mono % (Auto)  4.2 L    (5.3-12.2)  %


 


Eos % (Auto)  1.4    (0.8-7.0)  


 


Baso % (Auto)  1.7 H    (0.1-1.2)  %


 


Neut # (Auto)  1.51 L    (1.78-5.38)  K/mm3


 


Lymph # (Auto)  1.12 L    (1.32-3.57)  K/mm3


 


Mono # (Auto)  0.12 L    (0.30-0.82)  K/mm3


 


Eos # (Auto)  0.04    (0.04-0.54)  K/mm3


 


Baso # (Auto)  0.05    (0.01-0.08)  K/mm3


 


Manual Slide Review  Abnormal smear    


 


Sodium   136   (136-145)  mEq/L


 


Potassium   3.1 L   (3.5-5.1)  mEq/L


 


Chloride   103   ()  mEq/L


 


Carbon Dioxide   26   (21-32)  mEq/L


 


Anion Gap   10.1   (5-15)  


 


BUN   15   (7-18)  mg/dL


 


Creatinine   0.7   (0.7-1.3)  mg/dL


 


Est Cr Clr Drug Dosing   86.30   mL/min


 


Estimated GFR (MDRD)   > 60   (>60)  mL/min


 


BUN/Creatinine Ratio   21.4 H   (14-18)  


 


Glucose   110   ()  mg/dL


 


Calcium   7.4 L   (8.5-10.1)  mg/dL


 


Phosphorus   2.0 L   (2.6-4.7)  mg/dL


 


Magnesium   2.5 H   (1.8-2.4)  mg/dl


 


Total Bilirubin   0.7   (0.2-1.0)  mg/dL


 


AST   15   (15-37)  U/L


 


ALT   22   (16-63)  U/L


 


Alkaline Phosphatase   88   ()  U/L


 


Troponin I    < 0.017  (0.00-0.056)  ng/mL


 


Total Protein   5.6 L   (6.4-8.2)  g/dl


 


Albumin   2.7 L   (3.4-5.0)  g/dl


 


Globulin   2.9   gm/dL


 


Albumin/Globulin Ratio   0.9 L   (1-2)  











[facility] Results Last 24 Hours: 


                                  Microbiology











 10/28/20 08:04 Aerobic Blood Culture - Preliminary





 Blood - Venous - Lab Draw    NO GROWTH AFTER 1 DAY





 Anaerobic Blood Culture - Preliminary





    NO GROWTH AFTER 1 DAY


 


 10/28/20 07:49 Aerobic Blood Culture - Preliminary





 Blood - Venous    NO GROWTH AFTER 1 DAY





 Anaerobic Blood Culture - Preliminary





    NO GROWTH AFTER 1 DAY











Med Orders - Current: 


                               Current Medications





Acetaminophen (Tylenol)  650 mg PO Q4H PRN


   PRN Reason: Pain (Mild 1-3)/fever


Amlodipine Besylate (Norvasc)  5 mg PO DAILY [facility]


Apixaban (Eliquis)  5 mg PO BID [facility]


   Last Admin: 10/29/20 08:49 Dose:  5 mg


   Documented by: 


Diphenhydramine HCl (Benadryl)  25 mg IVPUSH Q4H PRN


   PRN Reason: Nausea/Vomiting


Famotidine (Pepcid)  20 mg PO BEDTIME PRN


   PRN Reason: Heartburn


Hydrochlorothiazide (Hydrochlorothiazide)  25 mg PO DAILY [facility]


Hydromorphone HCl (Dilaudid)  0.25 mg IVPUSH Q2H PRN


   PRN Reason: Pain (severe 7-10)


   Last Admin: 10/28/20 15:20 Dose:  0.25 mg


   Documented by: 


Dextrose/Sodium Chloride (Dextrose 5%-Normal Saline)  1,000 mls @ 999 mls/hr IV 

ASDIRECTED [facility]


   Last Admin: 10/28/20 07:37 Dose:  999 mls/hr


   Documented by: 


Lactated Ringer's (Ringers, Lactated)  1,000 mls @ 75 mls/hr IV ASDIRECTED [facility]


   Last Admin: 10/28/20 09:50 Dose:  75 mls/hr


   Documented by: 


Potassium Chloride 10 meq/ (Premix)  100 mls @ 100 mls/hr IV Q1H [facility]


   Stop: 10/29/20 13:59


   Last Admin: 10/29/20 08:33 Dose:  100 mls/hr


   Documented by: 


Sodium Chloride (Normal Saline)  1,000 mls @ 50 mls/hr IV ONETIME ONE


   Stop: 10/30/20 04:36


   Last Admin: 10/29/20 10:04 Dose:  50 mls/hr


   Documented by: 


Sodium Chloride (Normal Saline)  100 mls @ 75 mls/hr IV ASDIRECTED [facility]


   Stop: 10/29/20 13:00


   Last Admin: 10/29/20 10:47 Dose:  75 mls/hr


   Documented by: 


Losartan Potassium (Cozaar)  100 mg PO DAILY [facility]


Magnesium Oxide (Magnesium Oxide)  400 mg PO DAILY [facility]


Nitroglycerin (Nitrostat)  0.4 mg SL ASDIRECTED PRN


   PRN Reason: Chest Pain


   Last Admin: 10/29/20 08:34 Dose:  0.4 mg


   Documented by: 


Non-Formulary Medication (Pravastatin)  40 mg PO DAILY [facility]


Non-Formulary Medication (Prochlorperazine)  10 mg PO QID PRN


   PRN Reason: Nausea


Oxycodone HCl (Oxycodone)  5 mg PO Q4H PRN


   PRN Reason: Pain (moderate 4-6)


   Last Admin: 10/29/20 00:36 Dose:  5 mg


   Documented by: 


Potassium Chloride (Klor-Con 10)  10 meq PO Q48H [facility]


Pramipexole Dihydrochloride (Mirapex)  0.75 mg PO BEDTIME [facility]


   Last Admin: 10/28/20 22:31 Dose:  0.75 mg


   Documented by: 


Prednisone (Prednisone)  5 mg PO BID [facility]


   Last Admin: 10/29/20 08:49 Dose:  5 mg


   Documented by: 


Sodium Chloride (Saline Flush)  10 ml FLUSH ONETIME PRN


   PRN Reason: IV FLUSH


   Stop: 10/29/20 13:00


   Last Admin: 10/29/20 10:47 Dose:  10 ml


   Documented by: 


Tramadol HCl (Ultram)  25 - 50 mg PO TID PRN


   PRN Reason: Severe Pain


Tramadol HCl (Ultram)  50 mg PO TID PRN


   PRN Reason: Moderate Pain 





Discontinued Medications





Diatrizoate Meglum/Diatrizoate Sod (Gastrografin 37%)  120 ml PO ONETIME ONE


   Stop: 10/29/20 09:19


   Last Admin: 10/29/20 10:47 Dose:  45 ml


   Documented by: 


Hydromorphone HCl (Dilaudid)  0.5 mg IVPUSH ONETIME ONE


   Stop: 10/28/20 07:20


   Last Admin: 10/28/20 07:33 Dose:  0.5 mg


   Documented by: 


Hydromorphone HCl (Dilaudid)  0.5 mg IVPUSH ONETIME ONE


   Stop: 10/28/20 09:20


   Last Admin: 10/28/20 09:25 Dose:  0.5 mg


   Documented by: 


Sodium Chloride (Normal Saline) Confirm Administered Dose 1,000 mls @ as 

directed .ROUTE .STK-MED ONE


   Stop: 10/29/20 08:40


   Last Admin: 10/29/20 09:47 Dose:  Not Given


   Documented by: 


Iopamidol (Isovue-370 (76%))  100 ml IVPUSH ONETIME ONE


   Stop: 10/29/20 09:30


   Last Admin: 10/29/20 10:47 Dose:  100 ml


   Documented by: 


Iopamidol (Isovue-370 (76%))  50 ml IVPUSH ONETIME ONE


   Stop: 10/29/20 09:37


   Last Admin: 10/29/20 10:47 Dose:  50 ml


   Documented by: 


Metoclopramide HCl (Reglan)  7.5 mg IVPUSH ONETIME ONE


   Stop: 10/28/20 07:19


   Last Admin: 10/28/20 07:30 Dose:  7.5 mg


   Documented by: 


Morphine Sulfate (Morphine)  2 mg IVPUSH ONETIME ONE


   Stop: 10/29/20 08:18


Non-Formulary Medication (Nitroglycerin [Nitroglycerin])  0.6 mg SL ASDIRECTED 

PRN


   PRN Reason: Chest Pain


Ondansetron HCl (Zofran)  4 mg IV Q4H PRN


   PRN Reason: Nausea/Vomiting


Pramipexole Dihydrochloride (Mirapex)  0.5 mg PO ONETIME ONE


   Stop: 10/28/20 09:19


   Last Admin: 10/28/20 09:30 Dose:  0.5 mg


   Documented by: 











- Exam


Quality Assessment: DVT Prophylaxis (Takes Eliquis twice a day).  No: 

Supplemental Oxygen


General: Alert, Oriented, Cooperative


HEENT: Pupils Equal, Pupils Reactive, Mucous Membr. Moist/Pink


Neck: Supple, Trachea Midline.  No: Lymphadenopathy


Lungs: Clear to Auscultation, Normal Respiratory Effort


Cardiovascular: Regular Rate, Regular Rhythm, No Murmurs


GI/Abdominal Exam: Normal Bowel Sounds, Soft, Non-Tender, No Distention


 (Male) Exam: Deferred


Back Exam: Normal Inspection, Full Range of Motion


Extremities: Normal Inspection, Normal Range of Motion, Non-Tender, No Pedal 

Edema, Normal Capillary Refill


Peripheral Pulses: 2+: Radial (L), Radial (R), Dorsalis Pedis (L), Dorsalis 

Pedis (R)


Skin: Warm, Dry, Intact


Neurological: No New Focal Deficit


Psy/Mental Status: Alert, Normal Affect, Normal Mood





Sepsis Event Note





- Evaluation


Sepsis Screening Result: No Definite Risk





- Focused Exam


Vital Signs: 


                                   Vital Signs











  Temp Pulse Resp BP Pulse Ox


 


 10/29/20 08:34     160/65 H 


 


 10/29/20 08:23   71   156/68 H  99


 


 10/29/20 08:20   66   120/84  100


 


 10/29/20 04:38  98.2 F  63  16  135/80  97


 


 10/29/20 00:30  98.1 F  60  16  124/61  98














- Problem List & Annotations


(1) Adenocarcinoma of prostate, stage 4


SNOMED Code(s): 790067670, 690018069


   Code(s): C61 - MALIGNANT NEOPLASM OF PROSTATE   Status: Acute   Priority: 

Medium   Current Visit: Yes   





(2) Adverse effect of chemotherapy


SNOMED Code(s): 027106271


   Code(s): T45.1X5A - ADVERSE EFFECT OF ANTINEOPLASTIC AND IMMUNOSUP DRUGS, 

INIT   Status: Acute   Priority: High   Current Visit: Yes   


Qualifiers: 


   Encounter type: initial encounter   Qualified Code(s): T45.1X5A - Adverse 

effect of antineoplastic and immunosuppressive drugs, initial encounter   





(3) Intractable nausea and vomiting


SNOMED Code(s): 167850658


   Code(s): R11.2 - NAUSEA WITH VOMITING, UNSPECIFIED   Status: Acute   

Priority: High   Current Visit: Yes   





(4) Severe diarrhea


SNOMED Code(s): 503902633


   Code(s): K52.9 - NONINFECTIVE GASTROENTERITIS AND COLITIS, UNSPECIFIED   

Status: Acute   Priority: High   Current Visit: Yes   





(5) Vomiting


SNOMED Code(s): 691309642


   Code(s): R11.10 - VOMITING, UNSPECIFIED   Status: Acute   Priority: High   

Current Visit: Yes   


Qualifiers: 


   Vomiting type: bilious vomiting   Nausea presence: with nausea   Qualified 

Code(s): R11.14 - Bilious vomiting   





- Problem List Review


Problem List Initiated/Reviewed/Updated: Yes





- My Orders


Last 24 Hours: 


My Active Orders





10/28/20 10:52


Antiembolic Devices [RC] BID 





10/28/20 Lunch


[place] Diet [DIET] 





10/28/20 13:01


Consult to Dietician [CONS] Routine 





10/28/20 13:18


diphenhydrAMINE [Benadryl]   25 mg IVPUSH Q4H PRN 





10/28/20 14:50


Famotidine [Pepcid]   20 mg PO BEDTIME PRN 


Prochlorperazine   10 mg PO QID PRN 


traMADol [Ultram]   25 - 50 mg PO TID PRN 


traMADol [Ultram]   50 mg PO TID PRN 





10/28/20 22:00


Apixaban [Eliquis]   5 mg PO BID 


Pramipexole [Mirapex]   0.75 mg PO BEDTIME 


predniSONE   5 mg PO BID 





10/29/20 07:22


Nitroglycerin [Nitrostat]   0.4 mg SL ASDIRECTED PRN 





10/29/20 08:00


Potassium Chloride [KCl 10 MEQ in Water 100 ML] 10 meq   Premix Bag 1 bag IV Q1H







10/29/20 08:13


EKG 12 Lead [EK] Stat 





10/29/20 08:14


EKG Documentation Completion [RC] ASDIRECTED 





10/29/20 08:32


Chest 1V Frontal [CR] Routine 





10/29/20 08:35


Patient Status [ADT] Routine 





10/29/20 08:37


Abdomen Pelvis w Cont [CT] Routine 


Sodium Chloride 0.9% [Normal Saline] 1,000 ml IV ONETIME 





10/29/20 09:00


Losartan [Cozaar]   100 mg PO DAILY 


Magnesium Oxide   400 mg PO DAILY 


Potassium Chloride [Klor-Con 10]   10 meq PO Q48H 


Pravastatin   40 mg PO DAILY 


amLODIPine [Norvasc]   5 mg PO DAILY 


hydroCHLOROthiazide   25 mg PO DAILY 





10/29/20 09:18


CTA Chest W WO Contrast [Ang Chest] [CT] Routine 





10/29/20 11:30


TROPONIN I [CHEM] Timed 














- Assessment


Assessment:: 





10/29/20


On examination of patient today he was having severe chest pain and right upper 

quadrant abdominal pain.  He states that he has had this pain in the past but 

never this severe.  He reports that at that time he had tried to take a 

nitroglycerin tab but it did not help.  Stat EKG, chest x-ray, troponin, and BNP

 were ordered.  A CTA of the lungs and a CT of the abdomen were also ordered as 

the patient does have a history of PE and has a history of falling at home 

yesterday and he is taking Eliquis twice a day.  Chest x-ray shows: 1 widespread

 blastic osseous metastatic disease.  2 no acute pulmonary findings.  CTA of the

 chest showed: 1 extensive blastic osseous metastatic disease has progressed 

since July 2017.  2 no evidence of pulmonary emboli.  CT of abdomen and pelvis 

with contrast showed: 1.9 cm complex cyst at the lower pole of the right kidney 

but unchanged in size since previous exam over 3 years ago.  2 mural thickening 

involving the distal half of the transverse colon and proximal ascending colon 

worrisome for segmental colitis.  3 advanced osteoblastic metastatic disease has

 progressed since previous exam.  4 mild circumferential thickening of the 

rectum.  Twelve-lead EKG was unremarkable.  This did not show any ST elevation 

or depression signifying an acute MI or non-STEMI.  Initial troponin was less 

than 0.017, and repeat troponin III hours later was less than 0.017.  BNP was 

100.WBC 2.88 hemoglobin 9.8, Neutrophil percentage 52.4 without bands, Potassium

 3.1, BUN 15, Creatinine 0.7, GFR greater than 60.








- Plan


Plan:: 





10/28/20


* Severe vomiting and diarrhea that started last evening.


* History of metastatic prostate cancer stage IV currently being treated with 

  chemotherapy which causes significant nausea and diarrhea with each treatment.


* Near syncopal episode that occurred this morning due to volume loss, resulting

   in a fall and trauma to his ribs.


* Poor appetite for the last 2-1/2 days.


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


* Labs reveal a white count of 7.01, hemoglobin of 10.5, sodium 133, potassium 

  3.7, BUN 21, creatinine 1.1, GFR greater than 60, lactic acid 2.2, repeat 

  lactic acid 1.4, Troponin less than 0.017, C-reactive protein 3.4, , 

  urinalysis was unremarkable for infection.


* CT of the chest done without contrast reveals no obvious rib fractures or 

  pulmonary contusion.  There is no pneumothorax.  Mild changes of emphysema 

  with scarring in the lung apices right worse than the left.  Calcified 

  granuloma right middle lobe of lung.  Mild cardiomegaly.  Indwelling left 

  central venous catheter with tip in the superior vena cava.  There is evidence

   of diffuse sclerotic skeletal metastases which have progressed since 

  7/19/2017.  No fractures are identified degenerative arthritis throughout the 

  thoracic spine diffuse atherosclerosis of the thoracic and abdominal aorta 

  without any aneurysmal change.  Visualized portions of the upper abdomen 

  reveal liver to have fatty infiltration.  Gallbladder does not reveal any 

  calcified gallstones.  Stomach appears normal.  Spleen is slightly enlarged.  

  Kidneys are atrophic bilaterally without any obvious stones.  No signs of 

  urinary tract obstruction.  CT scans of the brain reveal no skull fractures.  

  No intracranial bleeding or mass-effect.  Diffuse small vessel ischemic change

   appreciated both basal ganglia.  No evidence of infarct identified.  There is

   some motion artifact on the CT.


PLAN: 


* Patient for observation status


* Lactated Ringer's at 75 mL's per hour.  We will continue at this rate as 

  patient has a history of congestive heart failure and I do not want to 

  overload him.


* Patient takes Eliquis we will continue this for DVT prophylaxis and order MATEO 

  stockings as he does wear these at home as well.


* Pain management as patient does have chronic back pain due to metastatic 

  cancer and restless leg syndrome.


*  to consult for discharge planning


* Dietary to consult.


* PT/OT eval and treat.


* Benadryl for nausea/vomiting








10/29/20


* Replace potassium IV today


* Continue Eliquis for DVT prophylaxis


* PT and OT to continue working with the patient


*  consult for discharge planning


* Upon returning to discussed the patient's test results with him, he reports 

  that he feels much better and is not having any chest pain or abdominal pain. 

   He states that he feels this is due to the chemotherapy and the disease 

  process, however he has never had a severe of pain as he did today.


* Dietary to consult as patient does not have much of an appetite.


* Plan will be to discharge the patient to home tomorrow morning.

## 2020-10-29 NOTE — CT
PROCEDURE INFORMATION:

Exam: CT Chest Without Contrast

Exam date and time: 10/28/2020 7:44 AM

Age: 80 years old

Clinical indication: Injury or trauma; Fall; Blunt trauma (contusions or 
hematomas); Injury date:

10/28/2020; Patient HX: Syncopal event this morning at home. Fell to tiled 
floor, pain at base of left

side neck adjacent to c7 vertebra. Transient loc, on xarelto RX



TECHNIQUE:

Imaging protocol: Computed tomography of the chest without contrast.

3D rendering (Not supervised by radiologist): MIP and/or 3D reconstructed images
were created

by the technologist.



COMPARISON:

CT Chest Abdomen Pelvis w Cont 7/19/2017 7:09 PM



FINDINGS:

Tubes, catheters and devices: Indwelling left central venous catheter with tip 
in SVC.

Lungs: Mild changes of emphysema with scarring in the lung apices right worse 
than. Calcified

granuloma right middle lobe.

Pleural space: Unremarkable. No pneumothorax. No pleural effusion.

Heart: Vascular calcifications including coronary artery calcifications.

Aorta: Unremarkable. No aortic aneurysm.

Lymph nodes: Unremarkable. No enlarged lymph nodes.

Bones/joints: Diffuse sclerotic skeletal metastases have progressed since 
07/19/2017. No fracture

identified. Degenerative arthritis in the spine.

Soft tissues: Unremarkable.



IMPRESSION:

1. No acute findings.

2. Diffuse sclerotic skeletal metastases have progressed since CT 07/19/2017.

3. Mild changes of emphysema and scarring in the apices.



YOVANI AUSTIN Accession: [id number] MRN:[id number] | Final Radiology Report

CONFIDENTIALITY STATEMENT

This report is intended only for use by the referring physician, and only in 
accordance with law. If you received this in error, call [phone].

Page 2 of 2



Thank you for allowing us to participate in the care of your patient.



Dictated and Authenticated by: Adelina Mckeon MD

10/28/2020 10:52 AM Central Time ([place] & [place])

DEYSI

## 2020-10-29 NOTE — CT
PROCEDURE INFORMATION:

Exam: CT Abdomen And Pelvis With Contrast

Exam date and time: 10/29/2020 10:30 AM

Age: 80 years old

Clinical indication: Abdominal pain; Additional info: Prior reports sent in 
todays images



TECHNIQUE:

Imaging protocol: Computed tomography of the abdomen and pelvis with intravenous
contrast.

Contrast material: ISOVUE 370; Contrast volume: 125 ml; Contrast route: 
INTRAVENOUS (IV);

Other contrast: Oral, gastrografin, 30;



COMPARISON:

CT Chest Abdomen Pelvis w Cont 7/19/2017 7:09 PM



FINDINGS:

Lungs: Minimal dependent atelectasis.

Liver: Normal. No mass.

Gallbladder and bile ducts: Normal. No calcified stones. No ductal dilation.

Pancreas: Normal. No ductal dilation.

Spleen: Normal. No splenomegaly.

Adrenal glands: Normal. No mass.

Kidneys and ureters: Punctate calcifications within the kidneys bilaterally. No 
hydronephrosis.

Hypodense lesions of the renal parenchyma, some consistent with cysts, others 
too small to definitely

characterize but statistically likely to be additional cysts. 1.9 cm complex 
cyst at the lower pole of the

right kidney but unchanged in size since previous exam over 3 years ago.

Stomach and bowel: Mural thickening involving the distal half of the transverse 
colon and proximal

ascending colon worrisome for segmental colitis. Evidence of previous small 
bowel surgery. Mild

circumferential thickening of the rectum.

Appendix: No evidence of appendicitis.

Intraperitoneal space: There is an elevated right hemidiaphragm present on the 
current

examination.

Vasculature: Unremarkable. No abdominal aortic aneurysm.

Lymph nodes: Unremarkable. No enlarged lymph nodes.

Urinary bladder: Unremarkable as visualized.

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 
parenchyma.

Bones/joints: There is multilevel degenerative disc disease. Advanced 
osteoblastic metastatic

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

Soft tissues: Unremarkable.



IMPRESSION:

1. 1.9 cm complex cyst at the lower pole of the right kidney but unchanged in 
size since previous

exam over 3 years ago.

2. Mural thickening involving the distal half of the transverse colon and 
proximal ascending colon

worrisome for segmental colitis.

3. 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

4. Mild circumferential thickening of the rectum.



Thank you for allowing us to participate in the care of your patient.



Dictated and Authenticated by: Carlos Coker MD

10/29/2020 12:25 PM Central Time ([place] & [place])

[facility]

## 2020-10-29 NOTE — CR
PROCEDURE INFORMATION:

Exam: XR Chest, 1 View

Exam date and time: 10/29/2020 8:20 AM

Age: 80 years old

Clinical indication: Chest pain



TECHNIQUE:

Imaging protocol: XR of the chest

Views: 1 view.



COMPARISON:

CT Chest wo Cont 10/28/2020 7:44 AM



FINDINGS:

Tubes, catheters and devices: An infusion port is present.

Lungs: Unremarkable. No consolidation.

Pleural space: Unremarkable. No pleural effusion. No pneumothorax.

Heart/Mediastinum: Unremarkable. No cardiomegaly.

Bones/joints: Widespread blastic osseous metastatic disease.



IMPRESSION:

1. Widespread blastic osseous metastatic disease.

2. No acute pulmonary findings



Thank you for allowing us to participate in the care of your patient.



Dictated and Authenticated by: Carlos Coker MD

10/29/2020 9:53 AM Central Time ([place] & [place])

[facility]

## 2020-10-29 NOTE — CT
PROCEDURE INFORMATION:

Exam: CT Cervical Spine Without Contrast

Exam date and time: 10/28/2020 7:44 AM

Age: 80 years old

Clinical indication: Injury or trauma; Fall; Blunt trauma; Patient HX: Syncopal 
event this morning at

home. Fell to tiled floor, pain at base of left side neck adjacent to c7 
vertebra. Transient loc, on

xarelto RX



TECHNIQUE:

Imaging protocol: Computed tomography images of the cervical spine without 
contrast.



COMPARISON:

No relevant prior studies available.



FINDINGS: Images are degraded by patient motion especially in the lower 
cervical.

Bones/joints: No acute fracture. Normal alignment. Multiple sclerotic metastases
are identified

throughout the vertebral bodies, visualized scapulae and ribs.

Discs/Spinal canal/Neural foramina: Narrowing of all the cervical interspaces 
with associated

endplate osteophyte formation. Relative sparing of C2-C3. Mild diffuse facet 
arthropathy. No

significant disc protrusion. No severe spinal canal stenosis. No significant 
neural foraminal narrowing.

Soft tissues: Diffuse vascular calcifications.

Lungs: Lung apices are normal.



IMPRESSION:

1. No acute findings.

2. Diffuse sclerotic skeletal metastases.

3. 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



Thank you for allowing us to participate in the care of your patient.



Dictated and Authenticated by: Adelina Mckeon MD OLHEISER, DAVID Accession: [id number] MRN:[id number] | Final Radiology Report

CONFIDENTIALITY STATEMENT

This report is intended only for use by the referring physician, and only in 
accordance with law. If you received this in error, call [phone].

Page 2 of 2



10/28/2020 10:42 AM Central Time ([place] & [place])

DEYSI

## 2020-10-30 VITALS — SYSTOLIC BLOOD PRESSURE: 123 MMHG | HEART RATE: 65 BPM | DIASTOLIC BLOOD PRESSURE: 75 MMHG

## 2020-10-30 RX ADMIN — HYDROCHLOROTHIAZIDE SCH: 25 TABLET ORAL at 09:16

## 2020-10-30 RX ADMIN — APIXABAN SCH: 5 TABLET, FILM COATED ORAL at 09:15

## 2020-10-30 RX ADMIN — PREDNISONE SCH: 5 TABLET ORAL at 09:15

## 2020-10-30 RX ADMIN — LOSARTAN POTASSIUM SCH: 100 TABLET, FILM COATED ORAL at 09:15

## 2020-10-30 RX ADMIN — ALENDRONATE SODIUM SCH: 70 TABLET ORAL at 09:16
